# Patient Record
Sex: MALE
[De-identification: names, ages, dates, MRNs, and addresses within clinical notes are randomized per-mention and may not be internally consistent; named-entity substitution may affect disease eponyms.]

---

## 2018-08-20 ENCOUNTER — RESULT REVIEW (OUTPATIENT)
Age: 63
End: 2018-08-20

## 2018-11-26 ENCOUNTER — RESULT REVIEW (OUTPATIENT)
Age: 63
End: 2018-11-26

## 2019-06-07 PROBLEM — Z00.00 ENCOUNTER FOR PREVENTIVE HEALTH EXAMINATION: Status: ACTIVE | Noted: 2019-06-07

## 2019-09-27 ENCOUNTER — APPOINTMENT (OUTPATIENT)
Dept: GASTROENTEROLOGY | Facility: CLINIC | Age: 64
End: 2019-09-27
Payer: COMMERCIAL

## 2019-09-27 VITALS
DIASTOLIC BLOOD PRESSURE: 91 MMHG | BODY MASS INDEX: 30.01 KG/M2 | SYSTOLIC BLOOD PRESSURE: 150 MMHG | HEIGHT: 68 IN | HEART RATE: 63 BPM | WEIGHT: 198 LBS

## 2019-09-27 PROCEDURE — 99213 OFFICE O/P EST LOW 20 MIN: CPT

## 2019-09-27 NOTE — ASSESSMENT
[FreeTextEntry1] : 63 y/o male here today with elevated LFTs and here to schedule a follow up egd.  \par Pt with hx of gerd. \par Continue ppi and gerd diet. \par Increase PPI to BID pending above. \par Mildly elevated lfts likely 2/2 fatty liver, will repeat blood work in several weeks. \par Check sonogram, and follow up egd. \par Discussed with Dr. Cruz.

## 2019-09-27 NOTE — PHYSICAL EXAM
[General Appearance - Alert] : alert [General Appearance - In No Acute Distress] : in no acute distress [Sclera] : the sclera and conjunctiva were normal [PERRL With Normal Accommodation] : pupils were equal in size, round, and reactive to light [Extraocular Movements] : extraocular movements were intact [Heart Rate And Rhythm] : heart rate was normal and rhythm regular [Auscultation Breath Sounds / Voice Sounds] : lungs were clear to auscultation bilaterally [Heart Sounds Gallop] : no gallops [Heart Sounds] : normal S1 and S2 [Murmurs] : no murmurs [Heart Sounds Pericardial Friction Rub] : no pericardial rub [Abdomen Soft] : soft [Bowel Sounds] : normal bowel sounds [Abdomen Tenderness] : non-tender [] : no hepato-splenomegaly [Abdomen Mass (___ Cm)] : no abdominal mass palpated [Abnormal Walk] : normal gait [Nail Clubbing] : no clubbing  or cyanosis of the fingernails [Motor Tone] : muscle strength and tone were normal [Musculoskeletal - Swelling] : no joint swelling seen [Impaired Insight] : insight and judgment were intact [Oriented To Time, Place, And Person] : oriented to person, place, and time [Affect] : the affect was normal

## 2019-09-27 NOTE — HISTORY OF PRESENT ILLNESS
[de-identified] : 63 y/o male here today with elevated LFTs and here to schedule a follow up egd.  \par Pt with hx of gerd, currently taken ppi once daily with increased breakthrough symptoms.  He also reports feeling as though his stool are more gassy.  He was also noted to have mildly elevated lfts by PCP.  Denies hx of fatty liver.  Denies increased alcohol use.  Denies any fevers, n/v, rectal bleeding, or melena. \par

## 2019-10-02 ENCOUNTER — RESULT REVIEW (OUTPATIENT)
Age: 64
End: 2019-10-02

## 2019-10-02 ENCOUNTER — APPOINTMENT (OUTPATIENT)
Dept: GASTROENTEROLOGY | Facility: AMBULATORY MEDICAL SERVICES | Age: 64
End: 2019-10-02
Payer: COMMERCIAL

## 2019-10-02 PROCEDURE — 43239 EGD BIOPSY SINGLE/MULTIPLE: CPT

## 2019-10-07 ENCOUNTER — RX RENEWAL (OUTPATIENT)
Age: 64
End: 2019-10-07

## 2019-11-27 LAB
ALBUMIN SERPL ELPH-MCNC: 4.6 G/DL
ALP BLD-CCNC: 48 U/L
ALT SERPL-CCNC: 48 U/L
ANION GAP SERPL CALC-SCNC: 14 MMOL/L
AST SERPL-CCNC: 27 U/L
BASOPHILS # BLD AUTO: 0.08 K/UL
BASOPHILS NFR BLD AUTO: 1.6 %
BILIRUB SERPL-MCNC: 0.7 MG/DL
BUN SERPL-MCNC: 19 MG/DL
CALCIUM SERPL-MCNC: 9.6 MG/DL
CHLORIDE SERPL-SCNC: 106 MMOL/L
CO2 SERPL-SCNC: 26 MMOL/L
CREAT SERPL-MCNC: 1.08 MG/DL
EOSINOPHIL # BLD AUTO: 0.24 K/UL
EOSINOPHIL NFR BLD AUTO: 4.9 %
GLUCOSE SERPL-MCNC: 98 MG/DL
HCT VFR BLD CALC: 43 %
HGB BLD-MCNC: 14.4 G/DL
IMM GRANULOCYTES NFR BLD AUTO: 0.2 %
LYMPHOCYTES # BLD AUTO: 1.22 K/UL
LYMPHOCYTES NFR BLD AUTO: 25.1 %
MAN DIFF?: NORMAL
MCHC RBC-ENTMCNC: 30.3 PG
MCHC RBC-ENTMCNC: 33.5 GM/DL
MCV RBC AUTO: 90.5 FL
MONOCYTES # BLD AUTO: 0.43 K/UL
MONOCYTES NFR BLD AUTO: 8.8 %
NEUTROPHILS # BLD AUTO: 2.89 K/UL
NEUTROPHILS NFR BLD AUTO: 59.4 %
PLATELET # BLD AUTO: 236 K/UL
POTASSIUM SERPL-SCNC: 4.5 MMOL/L
PROT SERPL-MCNC: 6.7 G/DL
RBC # BLD: 4.75 M/UL
RBC # FLD: 13.2 %
SODIUM SERPL-SCNC: 146 MMOL/L
WBC # FLD AUTO: 4.87 K/UL

## 2019-12-27 ENCOUNTER — APPOINTMENT (OUTPATIENT)
Dept: GASTROENTEROLOGY | Facility: CLINIC | Age: 64
End: 2019-12-27
Payer: COMMERCIAL

## 2019-12-27 VITALS
DIASTOLIC BLOOD PRESSURE: 86 MMHG | BODY MASS INDEX: 29.1 KG/M2 | HEART RATE: 66 BPM | SYSTOLIC BLOOD PRESSURE: 126 MMHG | WEIGHT: 192 LBS | HEIGHT: 68 IN

## 2019-12-27 DIAGNOSIS — Z80.9 FAMILY HISTORY OF MALIGNANT NEOPLASM, UNSPECIFIED: ICD-10-CM

## 2019-12-27 DIAGNOSIS — Z78.9 OTHER SPECIFIED HEALTH STATUS: ICD-10-CM

## 2019-12-27 DIAGNOSIS — R10.84 GENERALIZED ABDOMINAL PAIN: ICD-10-CM

## 2019-12-27 PROCEDURE — 99213 OFFICE O/P EST LOW 20 MIN: CPT

## 2019-12-27 NOTE — HISTORY OF PRESENT ILLNESS
[de-identified] : 65 y/o male here today with reports of worsening epigastric pains.  Pt s/p EGD with Dr. Kruse and was otherwise unremarkable.  He states for the past several days having epigastric pain and nausea.  About a few days ago he reports this pain was associated with chills and was going to go to the ER but didn't.  He is under significant amount of stress as he has a new boss.  Currently on PPI with not much relief.  Pt with hx of afib in past and had an ablation done.  He hasn't followed up with cardiology but is going to make an appointment.  Pt with hx of gerd, currently taken ppi once daily with increased breakthrough symptoms.  He also reports feeling as though his stool are more gassy.  Denies increased alcohol use.  Denies any fevers, n/v, rectal bleeding, or melena. \par

## 2019-12-27 NOTE — ASSESSMENT
[FreeTextEntry1] : 63 y/o male here today with hx of gerd and now worsening epigastric pain, nausea that is intermittent associated with chills. Currently patient is asymptomatic.  EGD noted, on ppi.  Hx of afib s/p ablation several years ago.  Pt instructed to f/u with cardiology and if pain returns to go to the ER for evaluation.  Pt already has appointment with Dr. Kruse.  Discussed with Dr. Cruz.

## 2019-12-27 NOTE — REVIEW OF SYSTEMS
[As Noted in HPI] : as noted in HPI [Abdominal Pain] : abdominal pain [Heartburn] : heartburn [Negative] : Heme/Lymph [FreeTextEntry7] : +gassy stools.

## 2019-12-27 NOTE — PHYSICAL EXAM
[General Appearance - Alert] : alert [General Appearance - In No Acute Distress] : in no acute distress [Sclera] : the sclera and conjunctiva were normal [Extraocular Movements] : extraocular movements were intact [PERRL With Normal Accommodation] : pupils were equal in size, round, and reactive to light [Auscultation Breath Sounds / Voice Sounds] : lungs were clear to auscultation bilaterally [Heart Rate And Rhythm] : heart rate was normal and rhythm regular [Heart Sounds] : normal S1 and S2 [Heart Sounds Gallop] : no gallops [Murmurs] : no murmurs [Heart Sounds Pericardial Friction Rub] : no pericardial rub [Bowel Sounds] : normal bowel sounds [Abdomen Soft] : soft [] : no hepato-splenomegaly [Abdomen Tenderness] : non-tender [Abnormal Walk] : normal gait [Abdomen Mass (___ Cm)] : no abdominal mass palpated [Nail Clubbing] : no clubbing  or cyanosis of the fingernails [Musculoskeletal - Swelling] : no joint swelling seen [Motor Tone] : muscle strength and tone were normal [Oriented To Time, Place, And Person] : oriented to person, place, and time [Affect] : the affect was normal [Impaired Insight] : insight and judgment were intact

## 2020-01-09 ENCOUNTER — APPOINTMENT (OUTPATIENT)
Dept: GASTROENTEROLOGY | Facility: CLINIC | Age: 65
End: 2020-01-09
Payer: COMMERCIAL

## 2020-01-09 VITALS
DIASTOLIC BLOOD PRESSURE: 98 MMHG | HEIGHT: 68 IN | BODY MASS INDEX: 28.79 KG/M2 | SYSTOLIC BLOOD PRESSURE: 153 MMHG | HEART RATE: 67 BPM | WEIGHT: 190 LBS

## 2020-01-09 DIAGNOSIS — R10.13 EPIGASTRIC PAIN: ICD-10-CM

## 2020-01-09 PROCEDURE — 99214 OFFICE O/P EST MOD 30 MIN: CPT

## 2020-01-09 NOTE — HISTORY OF PRESENT ILLNESS
[FreeTextEntry1] : The patient continues to have episode of epigastric pain that can last days associated with nausea. He has been under a great deal of stress his bowels are somewhat stringy with a mucous discharge and a recent sonogram showed he had gallstones he was tried on PPI therapy and Carafate with minimal success

## 2020-01-09 NOTE — PHYSICAL EXAM
[General Appearance - Alert] : alert [General Appearance - In No Acute Distress] : in no acute distress [Sclera] : the sclera and conjunctiva were normal [PERRL With Normal Accommodation] : pupils were equal in size, round, and reactive to light [Extraocular Movements] : extraocular movements were intact [Outer Ear] : the ears and nose were normal in appearance [Oropharynx] : the oropharynx was normal [Neck Cervical Mass (___cm)] : no neck mass was observed [Neck Appearance] : the appearance of the neck was normal [Jugular Venous Distention Increased] : there was no jugular-venous distention [Thyroid Diffuse Enlargement] : the thyroid was not enlarged [Thyroid Nodule] : there were no palpable thyroid nodules [Auscultation Breath Sounds / Voice Sounds] : lungs were clear to auscultation bilaterally [Heart Rate And Rhythm] : heart rate was normal and rhythm regular [Heart Sounds] : normal S1 and S2 [Heart Sounds Gallop] : no gallops [Murmurs] : no murmurs [Heart Sounds Pericardial Friction Rub] : no pericardial rub [Full Pulse] : the pedal pulses are present [Edema] : there was no peripheral edema [Bowel Sounds] : normal bowel sounds [Abdomen Soft] : soft [Abdomen Tenderness] : non-tender [Abdomen Mass (___ Cm)] : no abdominal mass palpated [Cervical Lymph Nodes Enlarged Posterior Bilaterally] : posterior cervical [Cervical Lymph Nodes Enlarged Anterior Bilaterally] : anterior cervical [Supraclavicular Lymph Nodes Enlarged Bilaterally] : supraclavicular [Axillary Lymph Nodes Enlarged Bilaterally] : axillary [Femoral Lymph Nodes Enlarged Bilaterally] : femoral [Inguinal Lymph Nodes Enlarged Bilaterally] : inguinal [No CVA Tenderness] : no ~M costovertebral angle tenderness [No Spinal Tenderness] : no spinal tenderness [Nail Clubbing] : no clubbing  or cyanosis of the fingernails [Abnormal Walk] : normal gait [Motor Tone] : muscle strength and tone were normal [Musculoskeletal - Swelling] : no joint swelling seen [Skin Color & Pigmentation] : normal skin color and pigmentation [] : no rash [Skin Turgor] : normal skin turgor [Deep Tendon Reflexes (DTR)] : deep tendon reflexes were 2+ and symmetric [No Focal Deficits] : no focal deficits [Sensation] : the sensory exam was normal to light touch and pinprick [Oriented To Time, Place, And Person] : oriented to person, place, and time [Impaired Insight] : insight and judgment were intact [Affect] : the affect was normal

## 2020-06-19 ENCOUNTER — EMERGENCY (EMERGENCY)
Facility: HOSPITAL | Age: 65
LOS: 0 days | Discharge: HOME | End: 2020-06-20
Attending: EMERGENCY MEDICINE
Payer: COMMERCIAL

## 2020-06-19 VITALS
DIASTOLIC BLOOD PRESSURE: 99 MMHG | SYSTOLIC BLOOD PRESSURE: 187 MMHG | TEMPERATURE: 98 F | HEART RATE: 71 BPM | HEIGHT: 68 IN | WEIGHT: 197.09 LBS | RESPIRATION RATE: 18 BRPM | OXYGEN SATURATION: 97 %

## 2020-06-19 VITALS
OXYGEN SATURATION: 98 % | HEART RATE: 62 BPM | SYSTOLIC BLOOD PRESSURE: 134 MMHG | DIASTOLIC BLOOD PRESSURE: 77 MMHG | RESPIRATION RATE: 16 BRPM

## 2020-06-19 DIAGNOSIS — R47.9 UNSPECIFIED SPEECH DISTURBANCES: ICD-10-CM

## 2020-06-19 DIAGNOSIS — R47.01 APHASIA: ICD-10-CM

## 2020-06-19 DIAGNOSIS — I48.91 UNSPECIFIED ATRIAL FIBRILLATION: ICD-10-CM

## 2020-06-19 DIAGNOSIS — R00.2 PALPITATIONS: ICD-10-CM

## 2020-06-19 DIAGNOSIS — K21.9 GASTRO-ESOPHAGEAL REFLUX DISEASE WITHOUT ESOPHAGITIS: ICD-10-CM

## 2020-06-19 DIAGNOSIS — G45.9 TRANSIENT CEREBRAL ISCHEMIC ATTACK, UNSPECIFIED: ICD-10-CM

## 2020-06-19 DIAGNOSIS — Z79.82 LONG TERM (CURRENT) USE OF ASPIRIN: ICD-10-CM

## 2020-06-19 DIAGNOSIS — Z90.49 ACQUIRED ABSENCE OF OTHER SPECIFIED PARTS OF DIGESTIVE TRACT: ICD-10-CM

## 2020-06-19 DIAGNOSIS — Z79.899 OTHER LONG TERM (CURRENT) DRUG THERAPY: ICD-10-CM

## 2020-06-19 DIAGNOSIS — I10 ESSENTIAL (PRIMARY) HYPERTENSION: ICD-10-CM

## 2020-06-19 LAB
ALBUMIN SERPL ELPH-MCNC: 3.9 G/DL — SIGNIFICANT CHANGE UP (ref 3.3–5)
ALP SERPL-CCNC: 45 U/L — SIGNIFICANT CHANGE UP (ref 40–120)
ALT FLD-CCNC: 47 U/L — SIGNIFICANT CHANGE UP (ref 12–78)
ANION GAP SERPL CALC-SCNC: 2 MMOL/L — LOW (ref 5–17)
APTT BLD: 30.1 SEC — SIGNIFICANT CHANGE UP (ref 27.5–36.3)
AST SERPL-CCNC: 20 U/L — SIGNIFICANT CHANGE UP (ref 15–37)
BASOPHILS # BLD AUTO: 0.07 K/UL — SIGNIFICANT CHANGE UP (ref 0–0.2)
BASOPHILS NFR BLD AUTO: 1.5 % — SIGNIFICANT CHANGE UP (ref 0–2)
BILIRUB SERPL-MCNC: 0.5 MG/DL — SIGNIFICANT CHANGE UP (ref 0.2–1.2)
BUN SERPL-MCNC: 22 MG/DL — SIGNIFICANT CHANGE UP (ref 7–23)
CALCIUM SERPL-MCNC: 8.4 MG/DL — LOW (ref 8.5–10.1)
CHLORIDE SERPL-SCNC: 111 MMOL/L — HIGH (ref 96–108)
CO2 SERPL-SCNC: 32 MMOL/L — HIGH (ref 22–31)
CREAT SERPL-MCNC: 0.92 MG/DL — SIGNIFICANT CHANGE UP (ref 0.5–1.3)
EOSINOPHIL # BLD AUTO: 0.26 K/UL — SIGNIFICANT CHANGE UP (ref 0–0.5)
EOSINOPHIL NFR BLD AUTO: 5.7 % — SIGNIFICANT CHANGE UP (ref 0–6)
GLUCOSE SERPL-MCNC: 91 MG/DL — SIGNIFICANT CHANGE UP (ref 70–99)
HCT VFR BLD CALC: 38.1 % — LOW (ref 39–50)
HGB BLD-MCNC: 13.2 G/DL — SIGNIFICANT CHANGE UP (ref 13–17)
IMM GRANULOCYTES NFR BLD AUTO: 0.2 % — SIGNIFICANT CHANGE UP (ref 0–1.5)
INR BLD: 1 RATIO — SIGNIFICANT CHANGE UP (ref 0.88–1.16)
LYMPHOCYTES # BLD AUTO: 1.54 K/UL — SIGNIFICANT CHANGE UP (ref 1–3.3)
LYMPHOCYTES # BLD AUTO: 34 % — SIGNIFICANT CHANGE UP (ref 13–44)
MCHC RBC-ENTMCNC: 29.9 PG — SIGNIFICANT CHANGE UP (ref 27–34)
MCHC RBC-ENTMCNC: 34.6 GM/DL — SIGNIFICANT CHANGE UP (ref 32–36)
MCV RBC AUTO: 86.2 FL — SIGNIFICANT CHANGE UP (ref 80–100)
MONOCYTES # BLD AUTO: 0.44 K/UL — SIGNIFICANT CHANGE UP (ref 0–0.9)
MONOCYTES NFR BLD AUTO: 9.7 % — SIGNIFICANT CHANGE UP (ref 2–14)
NEUTROPHILS # BLD AUTO: 2.21 K/UL — SIGNIFICANT CHANGE UP (ref 1.8–7.4)
NEUTROPHILS NFR BLD AUTO: 48.9 % — SIGNIFICANT CHANGE UP (ref 43–77)
PLATELET # BLD AUTO: 204 K/UL — SIGNIFICANT CHANGE UP (ref 150–400)
POTASSIUM SERPL-MCNC: 3.8 MMOL/L — SIGNIFICANT CHANGE UP (ref 3.5–5.3)
POTASSIUM SERPL-SCNC: 3.8 MMOL/L — SIGNIFICANT CHANGE UP (ref 3.5–5.3)
PROT SERPL-MCNC: 7 GM/DL — SIGNIFICANT CHANGE UP (ref 6–8.3)
PROTHROM AB SERPL-ACNC: 11.1 SEC — SIGNIFICANT CHANGE UP (ref 10–12.9)
RBC # BLD: 4.42 M/UL — SIGNIFICANT CHANGE UP (ref 4.2–5.8)
RBC # FLD: 13.2 % — SIGNIFICANT CHANGE UP (ref 10.3–14.5)
SODIUM SERPL-SCNC: 145 MMOL/L — SIGNIFICANT CHANGE UP (ref 135–145)
TROPONIN I SERPL-MCNC: <0.015 NG/ML — SIGNIFICANT CHANGE UP (ref 0.01–0.04)
WBC # BLD: 4.53 K/UL — SIGNIFICANT CHANGE UP (ref 3.8–10.5)
WBC # FLD AUTO: 4.53 K/UL — SIGNIFICANT CHANGE UP (ref 3.8–10.5)

## 2020-06-19 PROCEDURE — 85610 PROTHROMBIN TIME: CPT

## 2020-06-19 PROCEDURE — 85730 THROMBOPLASTIN TIME PARTIAL: CPT

## 2020-06-19 PROCEDURE — 84484 ASSAY OF TROPONIN QUANT: CPT

## 2020-06-19 PROCEDURE — 99285 EMERGENCY DEPT VISIT HI MDM: CPT

## 2020-06-19 PROCEDURE — 99285 EMERGENCY DEPT VISIT HI MDM: CPT | Mod: 25

## 2020-06-19 PROCEDURE — 70450 CT HEAD/BRAIN W/O DYE: CPT | Mod: 26

## 2020-06-19 PROCEDURE — 85025 COMPLETE CBC W/AUTO DIFF WBC: CPT

## 2020-06-19 PROCEDURE — 80053 COMPREHEN METABOLIC PANEL: CPT

## 2020-06-19 PROCEDURE — 93010 ELECTROCARDIOGRAM REPORT: CPT

## 2020-06-19 PROCEDURE — 93005 ELECTROCARDIOGRAM TRACING: CPT

## 2020-06-19 PROCEDURE — 70450 CT HEAD/BRAIN W/O DYE: CPT

## 2020-06-19 PROCEDURE — 36415 COLL VENOUS BLD VENIPUNCTURE: CPT

## 2020-06-19 NOTE — ED ADULT NURSE NOTE - CHPI ED NUR SYMPTOMS NEG
no syncope/no diaphoresis/no back pain/no dizziness/no shortness of breath/no chest pain/no congestion/no nausea/no chills/no fever/no vomiting

## 2020-06-19 NOTE — ED PROVIDER NOTE - NS ED SCRIBE STATEMENT
Past Patient History





- Past Medical History & Family History


Past Medical History?: Yes





- Past Social History


Smoking Status: Never Smoked





- CARDIAC


Hx Cardiac Disorders: No





- PULMONARY


Hx Respiratory Disorders: No





- NEUROLOGICAL


Hx Neurological Disorder: No





- HEENT


Hx HEENT Problems: No





- RENAL


Hx Chronic Kidney Disease: No





- ENDOCRINE/METABOLIC


Hx Endocrine Disorders: Yes


Hx Hyperthyroidism: Yes





- HEMATOLOGICAL/ONCOLOGICAL


Hx Blood Disorders: No





- INTEGUMENTARY


Hx Dermatological Problems: No





- MUSCULOSKELETAL/RHEUMATOLOGICAL


Hx Musculoskeletal Disorders: Yes


Hx Falls: Yes


Hx Fractures: Yes (Left ankle)


Hx Herniated Disk: Yes (Lower back)


Hx Osteoarthritis: Yes (Right shoulder)





- GASTROINTESTINAL


Hx Gastrointestinal Disorders: Yes


Hx Bowel Surgery: Yes (BOWEL RESECTIO FOR DIVERTICULITIS)


Hx Diverticulitis: Yes





- GENITOURINARY/GYNECOLOGICAL


Hx Genitourinary Disorders: Yes


Hx Bladder Stone: Yes


Hx Prostate Problems: Yes


Other/Comment: HX: "BLADDER STONES" STENTS PLACED AND REMOVED.  HX: URINARY 

RETENTION-PT. HAS URINARY CATHETER-RETENTION STARTED AFTER SURGERY FOR 

DIVERTULITIS





- PSYCHIATRIC


Hx Psychophysiologic Disorder: No





- SURGICAL HISTORY


Hx Surgeries: Yes


Hx Herniorrhaphy: Yes (UMBILICAL)





- ANESTHESIA


Hx Anesthesia: Yes


Hx Anesthesia Reactions: No


Hx Malignant Hyperthermia: No





Meds


Allergies/Adverse Reactions: 


 Allergies











Allergy/AdvReac Type Severity Reaction Status Date / Time


 


No Known Allergies Allergy   Verified 02/28/17 11:00














Results





- Vital Signs


Recent Vital Signs: 





 Last Vital Signs











Temp  97 F L  07/20/18 13:50


 


Pulse  67   07/20/18 15:05


 


Resp  17   07/20/18 15:05


 


BP  125/75   07/20/18 15:05


 


Pulse Ox  100   07/20/18 15:05 Attending

## 2020-06-19 NOTE — ED PROVIDER NOTE - PROGRESS NOTE DETAILS
64 y/o M presents with difficulty speaking on 6/16/ Pt was at work at 10 AM and felt he had diffiuclty getting out his words lasting for approx 45 minutes, and had another episode approx 1 hr later lasting 20 minutes. Pt saw his dr today for routine visit, was started on asa and scheduled for carotid dopplar tomorrow as well as neuro and cardiology FU. Pt came to ED today because he felt palpitations and was concerned he was in afib. Denies fever/chills, n/v/d, CP, SOB, abd pain, weakness, numbness or tingling or other complaints at this time. -Lalo Hawk PA-C Dr. Norwood:  Signout to Dr. Rea:  [] CT head,   [] labs incl. troponin,   []cardiac monitor (EKG: + NSR).  Expect D/C home on ASA-325 if studies negative, has Cardio./Neuro referrals for next week. Results reviewed and discussed with pt. Pt to continue taking asa. He has appt for carotid doppler tomorrow, will FU with his PMD/neuro. Discussed importance of close FU with PMD. Pt asked to return to ED immediately for any new or concerning sx or worsening. Pt acknowledges and understands plan -Lalo Hawk PA-C

## 2020-06-19 NOTE — ED PROVIDER NOTE - ATTENDING CONTRIBUTION TO CARE
I, Jose Norwood MD, personally saw the patient with the PA, and completed the key components of the history and physical exam. I then discussed the management plan with the PA.

## 2020-06-19 NOTE — ED ADULT TRIAGE NOTE - CHIEF COMPLAINT QUOTE
Pt was sent here by Dr. Alonso to rule out TIA. On Tuesday while at work patient states he had difficulty word finding but no other symptom for about 45 minutes. Pt did not come to hospital or get assessed until today. Pt currently does not show any signs of a stroke/TIA. Hx Afib, does not take any medications. No Code stroke needed per Dr. Huerta.

## 2020-06-19 NOTE — ED PROVIDER NOTE - NEUROLOGICAL, MLM
Alert and oriented x 3, cn 2-12 intact, normal speech, no focal deficits, no motor or sensory deficits.

## 2020-06-19 NOTE — ED PROVIDER NOTE - NSFOLLOWUPINSTRUCTIONS_ED_ALL_ED_FT
Continue your regul;ar medications as per routine.  Aspirin-325 mg 1 tab daily.  Follow up with Neurology & cardiology offices as provided by your PCP.      ED evaluation and management discussed with the patient and family (if available) in detail.  Close PMD follow up encouraged.  Strict ED return instructions discussed in detail and patient given the opportunity to ask any questions about their discharge diagnosis and instructions. Patient verbalized understanding. Continue your regul;ar medications as per routine.  Aspirin-325 mg 1 tab daily.  Follow up with Neurology & cardiology offices as provided by your PCP.      ED evaluation and management discussed with the patient and family (if available) in detail.  Close PMD follow up encouraged.  Strict ED return instructions discussed in detail and patient given the opportunity to ask any questions about their discharge diagnosis and instructions. Patient verbalized understanding.      Transient Ischemic Attack  A transient ischemic attack (TIA) is a "warning stroke" that causes stroke-like symptoms that then go away quickly. The symptoms of a TIA come on suddenly, and they last less than 24 hours. Unlike a stroke, a TIA does not cause permanent damage to the brain. It is important to know the symptoms of a TIA and what to do. Seek medical care right away, even if your symptoms go away.  Having a TIA is a sign that you are at higher risk for a permanent stroke. Lifestyle changes and medical treatments can help prevent a stroke.  What are the causes?  This condition is caused by a temporary blockage in an artery in the head or neck. The blockage does not allow the brain to get the blood supply it needs and can cause various symptoms. The blockage can be caused by:  Fatty buildup in an artery in the head or neck (atherosclerosis).A blood clot.Tearing of an artery (dissection).Inflammation of an artery (vasculitis).Sometimes the cause is not known.  What increases the risk?  Certain factors may make you more likely to develop this condition. Some of these factors are things that you can change, such as:  Obesity.Using products that contain nicotine or tobacco, such as cigarettes and e-cigarettes.Taking oral birth control, especially if you also use tobacco.Lack of physical inactivity.Excessive use of alcohol.Use of drugs, especially cocaine and methamphetamine.Other risk factors include:  High blood pressure (hypertension).High cholesterol.Diabetes mellitus.Heart disease (coronary artery disease).Atrial fibrillation.Being  or .Being over the age of 60.Being male.Family history of stroke.Previous history of blood clots, stroke, TIA, or heart attack.Sickle cell disease.Being a woman with a history of preeclampsia.Migraine headache.Sleep apnea.Chronic inflammatory diseases, such as rheumatoid arthritis or lupus.Blood clotting disorders (hypercoagulable state).What are the signs or symptoms?  Symptoms of this condition are the same as those of a stroke, but they are temporary. The symptoms develop suddenly, and they go away quickly, usually within minutes to hours. Symptoms may include sudden:  Weakness or numbness in your face, arm, or leg, especially on one side of your body.Trouble walking or difficulty moving your arms or legs.Trouble speaking, understanding speech, or both (aphasia).Vision changes in one or both eyes. These include double vision, blurred vision, or loss of vision.Dizziness.Confusion.Loss of balance or coordination.Nausea and vomiting.Severe headache with no known cause.If possible, make note of the exact time that you last felt like your normal self and what time your symptoms started. Tell your health care provider.  How is this diagnosed?  This condition may be diagnosed based on:  Your symptoms and medical history.A physical exam.Imaging tests, usually a CT or MRI scan of the brain.Blood tests.You may also have other tests, including:  Electrocardiogram (ECG).Echocardiogram.Carotid ultrasound.A scan of the brain circulation (CT angiogram or MRI angiogram).Continuous heart monitoring.How is this treated?  The goal of treatment is to reduce the risk for a subsequent stroke. Treatment may include stroke prevention therapies such as:  Changes to diet or lifestyle to decrease your risk. Lifestyle changes may include exercising and stopping smoking.Medicines to thin the blood (antiplatelets or anticoagulants).Blood pressure medicines.Medicines to reduce cholesterol.Treating other health conditions, such as diabetes or atrial fibrillation.If testing shows that you have narrowing in the arteries to your brain, your health care provider may recommend a procedure, such as:  Carotid endarterectomy. This is a surgery to remove the blockage from your artery.Carotid angioplasty and stenting. This is a procedure to open or widen an artery in the neck using a metal mesh tube (stent). The stent helps keep the artery open by supporting the artery walls.Follow these instructions at home:  Medicines        Take over-the-counter and prescription medicines only as told by your health care provider.If you were told to take a medicine to thin your blood, such as aspirin or an anticoagulant, take it exactly as told by your health care provider.  Taking too much blood-thinning medicine can cause bleeding.If you do not take enough blood-thinning medicine, you will not have the protection that you need against a stroke and other problems.Eating and drinking        Eat 5 or more servings of fruits and vegetables each day.Follow instructions from your health care provider about diet. You may need to follow a certain nutrition plan to help manage risk factors for stroke, such as high blood pressure, high cholesterol, diabetes, or obesity. This may include:  Eating a low-fat, low-salt diet.Including a lot of fiber in your diet.Limiting the amount of carbohydrates and sugar in your diet.Limit alcohol intake to no more than 1 drink a day for nonpregnant women and 2 drinks a day for men. One drink equals 12 oz of beer, 5 oz of wine, or 1½ oz of hard liquor.General instructions     Maintain a healthy weight.Stay physically active. Try to get at least 30 minutes of exercise on most or all days.Find out if you have sleep apnea, and seek treatment if needed.Do not use any products that contain nicotine or tobacco, such as cigarettes and e-cigarettes. If you need help quitting, ask your health care provider.Do not abuse drugs.Keep all follow-up visits as told by your health care provider. This is important.Where to find more information  American Stroke Association: www.strokeassociation.orgNational Stroke Association: www.stroke.orgGet help right away if:  You have chest pain or an irregular heartbeat.You have any symptoms of stroke. The acronym BEFAST is an easy way to remember the main warning signs of stroke.  B - Balance problems. Signs include dizziness, sudden trouble walking, or loss of balance.E - Eye problems. This includes trouble seeing or a sudden change in vision.F - Face changes. This includes sudden weakness or numbness of the face, or the face or eyelid drooping to one side.A - Arm weakness or numbness. This happens suddenly and usually on one side of the body.S - Speech problems. This includes trouble speaking or trouble understanding speech.T - Time. Time to call 911 or seek emergency care. Do not wait to see if symptoms will go away. Make note of the time your symptoms started.Other signs of stroke may include:  A sudden, severe headache with no known cause.Nausea or vomiting.Seizure.These symptoms may represent a serious problem that is an emergency. Do not wait to see if the symptoms will go away. Get medical help right away. Call your local emergency services (911 in the [place] Do not drive yourself to the hospital.   Summary  A TIA happens when an artery in the head or neck is blocked, leading to stroke-like symptoms that then go away quickly. The blockage clears before there is any permanent brain damage. A TIA is a medical emergency and requires immediate medical attention.Symptoms of this condition are the same as those of a stroke, but they are temporary. The symptoms usually develop suddenly, and they go away quickly, usually within minutes to hours.Having a TIA means that you are at high risk of a stroke in the near future.Treatment may include medicines to thin the blood as well as medicines, diet changes, and lifestyle changes to manage conditions that increase the risk of another TIA or a stroke.This information is not intended to replace advice given to you by your health care provider. Make sure you discuss any questions you have with your health care provider. Continue your regul;ar medications as per routine.  Aspirin-325 mg 1 tab daily.  Follow up with Neurology & cardiology offices as provided by your PCP.      ED evaluation and management discussed with the patient and family (if available) in detail.  Close PMD follow up encouraged.  Strict ED return instructions discussed in detail and patient given the opportunity to ask any questions about their discharge diagnosis and instructions. Patient verbalized understanding.      Transient Ischemic Attack  A transient ischemic attack (TIA) is a "warning stroke" that causes stroke-like symptoms that then go away quickly. The symptoms of a TIA come on suddenly, and they last less than 24 hours. Unlike a stroke, a TIA does not cause permanent damage to the brain. It is important to know the symptoms of a TIA and what to do. Seek medical care right away, even if your symptoms go away.  Having a TIA is a sign that you are at higher risk for a permanent stroke. Lifestyle changes and medical treatments can help prevent a stroke.  What are the causes?  This condition is caused by a temporary blockage in an artery in the head or neck. The blockage does not allow the brain to get the blood supply it needs and can cause various symptoms. The blockage can be caused by:  Fatty buildup in an artery in the head or neck (atherosclerosis).A blood clot.Tearing of an artery (dissection).Inflammation of an artery (vasculitis).Sometimes the cause is not known.  What increases the risk?  Certain factors may make you more likely to develop this condition. Some of these factors are things that you can change, such as:  Obesity.Using products that contain nicotine or tobacco, such as cigarettes and e-cigarettes.Taking oral birth control, especially if you also use tobacco.Lack of physical inactivity.Excessive use of alcohol.Use of drugs, especially cocaine and methamphetamine.Other risk factors include:  High blood pressure (hypertension).High cholesterol.Diabetes mellitus.Heart disease (coronary artery disease).Atrial fibrillation.Being  or .Being over the age of 60.Being male.Family history of stroke.Previous history of blood clots, stroke, TIA, or heart attack.Sickle cell disease.Being a woman with a history of preeclampsia.Migraine headache.Sleep apnea.Chronic inflammatory diseases, such as rheumatoid arthritis or lupus.Blood clotting disorders (hypercoagulable state).What are the signs or symptoms?  Symptoms of this condition are the same as those of a stroke, but they are temporary. The symptoms develop suddenly, and they go away quickly, usually within minutes to hours. Symptoms may include sudden:  Weakness or numbness in your face, arm, or leg, especially on one side of your body.Trouble walking or difficulty moving your arms or legs.Trouble speaking, understanding speech, or both (aphasia).Vision changes in one or both eyes. These include double vision, blurred vision, or loss of vision.Dizziness.Confusion.Loss of balance or coordination.Nausea and vomiting.Severe headache with no known cause.If possible, make note of the exact time that you last felt like your normal self and what time your symptoms started. Tell your health care provider.  How is this diagnosed?  This condition may be diagnosed based on:  Your symptoms and medical history.A physical exam.Imaging tests, usually a CT or MRI scan of the brain.Blood tests.You may also have other tests, including:  Electrocardiogram (ECG).Echocardiogram.Carotid ultrasound.A scan of the brain circulation (CT angiogram or MRI angiogram).Continuous heart monitoring.How is this treated?  The goal of treatment is to reduce the risk for a subsequent stroke. Treatment may include stroke prevention therapies such as:  Changes to diet or lifestyle to decrease your risk. Lifestyle changes may include exercising and stopping smoking.Medicines to thin the blood (antiplatelets or anticoagulants).Blood pressure medicines.Medicines to reduce cholesterol.Treating other health conditions, such as diabetes or atrial fibrillation.If testing shows that you have narrowing in the arteries to your brain, your health care provider may recommend a procedure, such as:  Carotid endarterectomy. This is a surgery to remove the blockage from your artery.Carotid angioplasty and stenting. This is a procedure to open or widen an artery in the neck using a metal mesh tube (stent). The stent helps keep the artery open by supporting the artery walls.Follow these instructions at home:  Medicines        Take over-the-counter and prescription medicines only as told by your health care provider.If you were told to take a medicine to thin your blood, such as aspirin or an anticoagulant, take it exactly as told by your health care provider.  Taking too much blood-thinning medicine can cause bleeding.If you do not take enough blood-thinning medicine, you will not have the protection that you need against a stroke and other problems.Eating and drinking        Eat 5 or more servings of fruits and vegetables each day.Follow instructions from your health care provider about diet. You may need to follow a certain nutrition plan to help manage risk factors for stroke, such as high blood pressure, high cholesterol, diabetes, or obesity. This may include:  Eating a low-fat, low-salt diet.Including a lot of fiber in your diet.Limiting the amount of carbohydrates and sugar in your diet.Limit alcohol intake to no more than 1 drink a day for nonpregnant women and 2 drinks a day for men. One drink equals 12 oz of beer, 5 oz of wine, or 1½ oz of hard liquor.General instructions     Maintain a healthy weight.Stay physically active. Try to get at least 30 minutes of exercise on most or all days.Find out if you have sleep apnea, and seek treatment if needed.Do not use any products that contain nicotine or tobacco, such as cigarettes and e-cigarettes. If you need help quitting, ask your health care provider.Do not abuse drugs.Keep all follow-up visits as told by your health care provider. This is important.Where to find more information  American Stroke Association: www.strokeassociation.orgNational Stroke Association: www.stroke.orgGet help right away if:  You have chest pain or an irregular heartbeat.You have any symptoms of stroke. The acronym BEFAST is an easy way to remember the main warning signs of stroke.  B - Balance problems. Signs include dizziness, sudden trouble walking, or loss of balance.E - Eye problems. This includes trouble seeing or a sudden change in vision.F - Face changes. This includes sudden weakness or numbness of the face, or the face or eyelid drooping to one side.A - Arm weakness or numbness. This happens suddenly and usually on one side of the body.S - Speech problems. This includes trouble speaking or trouble understanding speech.T - Time. Time to call 911 or seek emergency care. Do not wait to see if symptoms will go away. Make note of the time your symptoms started.Other signs of stroke may include:  A sudden, severe headache with no known cause.Nausea or vomiting.Seizure.These symptoms may represent a serious problem that is an emergency. Do not wait to see if the symptoms will go away. Get medical help right away. Call your local emergency services (911 in the [place] Do not drive yourself to the hospital.   Summary  A TIA happens when an artery in the head or neck is blocked, leading to stroke-like symptoms that then go away quickly. The blockage clears before there is any permanent brain damage. A TIA is a medical emergency and requires immediate medical attention.Symptoms of this condition are the same as those of a stroke, but they are temporary. The symptoms usually develop suddenly, and they go away quickly, usually within minutes to hours.Having a TIA means that you are at high risk of a stroke in the near future.Treatment may include medicines to thin the blood as well as medicines, diet changes, and lifestyle changes to manage conditions that increase the risk of another TIA or a stroke.This information is not intended to replace advice given to you by your health care provider. Make sure you discuss any questions you have with your health care provider.        DASH Eating Plan    WHAT YOU NEED TO KNOW:    The DASH (Dietary Approaches to Stop Hypertension) Eating Plan is designed to help prevent or lower high blood pressure. It can also help to lower LDL (bad) cholesterol and decrease your risk of heart disease. The plan is low in sodium, sugar, unhealthy fats, and total fat. It is high in potassium, calcium, magnesium, and fiber. These nutrients are added when you eat more fruits, vegetables, and whole grains.          DISCHARGE INSTRUCTIONS:    Your sodium limit each day: Your dietitian will tell you how much sodium is safe for you to have each day. People with high blood pressure should have no more than 1,500 to 2,300 mg of sodium in a day. A teaspoon (tsp) of salt has 2,300 mg of sodium. This may seem like a difficult goal, but small changes to the foods you eat can make a big difference. Your healthcare provider or dietitian can help you create a meal plan that follows your sodium limit.    How to limit sodium:     Read food labels. Food labels can help you choose foods that are low in sodium. The amount of sodium is listed in milligrams (mg). The % Daily Value (DV) column tells you how much of your daily needs are met by 1 serving of the food for each nutrient listed. Choose foods that have less than 5% of the DV of sodium. These foods are considered low in sodium. Foods that have 20% or more of the DV of sodium are considered high in sodium. Avoid foods that have more than 300 mg of sodium in each serving. Choose foods that say low-sodium, reduced-sodium, or no salt added on the food label.           Avoid salt. Do not salt food at the table, and add very little salt to foods during cooking. Use herbs and spices, such as onions, garlic, and salt-free seasonings to add flavor to foods. Try lemon or lime juice or vinegar to give foods a tart flavor. Use hot peppers or a small amount of hot pepper sauce to add a spicy flavor to foods.       Ask about salt substitutes. Ask your healthcare provider if you may use salt substitutes. Some salt substitutes have ingredients that can be harmful if you have certain health conditions.      Choose foods carefully at restaurants. Meals from restaurants, especially fast food restaurants, are often high in sodium. Some restaurants have nutrition information that tells you the amount of sodium in their foods. Ask to have your food prepared with less, or no salt.     What you need to know about fats:     Include healthy fats. Examples are unsaturated fats and omega-3 fatty acids. Unsaturated fats are found in soybean, canola, olive, or sunflower oil, and liquid and soft tub margarines. Omega-3 fatty acids are found in fatty fish, such as salmon, tuna, mackerel, and sardines. It is also found in flaxseed oil and ground flaxseed. Sources of Omega 3           Avoid unhealthy fats. Do not eat unhealthy fats, such as saturated fats and trans fats. Saturated fats are found in foods that contain fat from animals. Examples are fatty meats, whole milk, butter, cream, and other dairy foods. It is also found in shortening, stick margarine, palm oil, and coconut oil. Trans fats are found in fried foods, crackers, chips, and baked goods made with margarine or shortening.     Foods to include: With the DASH eating plan, you need to eat a certain number of servings from each food group. This will help you get enough of certain nutrients and limit others. The amount of servings you should eat depends on how many calories you need. Your dietitian can tell you how many calories you need. The number of servings listed next to the food groups below are for people who need about 2,000 calories each day.     Grains: 6 to 8 servings (3 of these servings should be whole-grain foods)  1 slice of whole-grain bread       1 ounce of dry cereal      ½ cup of cooked cereal, pasta, or brown rice      Vegetables and fruits: 4 to 5 servings of fruits and 4 to 5 servings of vegetables  1 medium fruit      ½ cup of frozen, canned (no added salt), or chopped fresh vegetables       ½ cup of fresh, frozen, dried, or canned fruit (canned in light syrup or fruit juice)      ½ cup of vegetable or fruit juice      Dairy: 2 to 3 servings  1 cup of nonfat (skim) or 1% milk      1½ ounces of fat-free or low-fat cheese      6 ounces of nonfat or low-fat yogurt      Lean meat, poultry, and fish: 6 ounces or less  Poultry (chicken, turkey) with no skin      Fish (especially fatty fish, such as salmon, fresh tuna, or mackerel)      Lean beef and pork (loin, round, extra lean hamburger)      Egg whites and egg substitutes      Nuts, seeds, and legumes: 4 to 5 servings each week  ½ cup of cooked beans and peas      1½ ounces of unsalted nuts      2 tablespoons of peanut butter or seeds      Sweets and added sugars: 5 or less each week  1 tablespoon of sugar, jelly, or jam      ½ cup of sorbet or gelatin      1 cup of lemonade      Fats: 2 to 3 servings each week  1 teaspoon of soft margarine or vegetable oil      1 tablespoon of mayonnaise      2 tablespoons of salad dressing    Foods to avoid:   Grains:   Baked goods, such as doughnuts, pastries, cookies, and biscuits (high in fat and sugar)    Mixes for cornbread and biscuits, packaged foods, such as bread stuffing, rice and pasta mixes, macaroni and cheese, and instant cereals (high in sodium)    Fruits and vegetables:   Regular, canned vegetables (high in sodium)    Sauerkraut, pickled vegetables, and other foods prepared in brine (high in sodium)    Fried vegetables or vegetables in butter or high-fat sauces    Fruit in cream or butter sauce (high in fat)    Dairy:   Whole milk, 2% milk, and cream (high in fat)    Regular cheese and processed cheese (high in fat and sodium)    Meats and protein foods:   Smoked or cured meat, such as corned beef, hunt, ham, hot dogs, and sausage (high in fat and sodium)    Canned beans and canned meats or spreads, such as potted meats, sardines, anchovies, and imitation seafood (high in sodium)    Deli or lunch meats, such as bologna, ham, turkey, and roast beef (high in sodium)    High-fat meat (T-bone steak, regular hamburger, and ribs)    Whole eggs and egg yolks (high in fat)    Other:   Seasonings made with salt, such as garlic salt, celery salt, onion salt, seasoned salt, meat tenderizers, and monosodium glutamate (MSG)    Miso soup and canned or dried soup mixes (high in sodium)    Regular soy sauce, barbecue sauce, teriyaki sauce, steak sauce, Worcestershire sauce, and most flavored vinegars (high in sodium)    Regular condiments, such as mustard, ketchup, and salad dressings (high in sodium)    Gravy and sauces, such as Will or cheese sauces (high in sodium and fat)    Drinks high in sugar, such as soda or fruit drinks    Snack foods, such as salted chips, popcorn, pretzels, pork rinds, salted crackers, and salted nuts    Frozen foods, such as dinners, entrees, vegetables with sauces, and breaded meats (high in sodium)    Other guidelines to follow:     Maintain a healthy weight. Your risk for heart disease is higher if you are overweight. Your healthcare provider may suggest that you lose weight if you are overweight. You can lose weight by eating fewer calories and foods that have added sugars and fat. The DASH meal plan can help you do this. Decrease calories by eating smaller portions at each meal and fewer snacks. Ask your healthcare provider for more information about how to lose weight.     Exercise regularly. Regular exercise can help you reach or maintain a healthy weight. Regular exercise can also help decrease your blood pressure and improve your cholesterol levels. Get 30 minutes or more of moderate exercise each day of the week. To lose weight, get at least 60 minutes of exercise. Talk to your healthcare provider about the best exercise program for you.Walking for Exercise     Limit alcohol. Women should limit alcohol to 1 drink a day. Men should limit alcohol to 2 drinks a day. A drink of alcohol is 12 ounces of beer, 5 ounces of wine, or 1½ ounces of liquor.

## 2020-06-19 NOTE — ED ADULT NURSE NOTE - OBJECTIVE STATEMENT
Pt A&Ox3 c/o difficulty speaking, palpitations.  Patient reports episode of difficulty speaking while at work on 6/16 lasting about 30-45 minutes.  Patient denies headache, visual changes, weakness, gait abnormality.  Patient seen by PCP today, directed to take aspirin and set up f/u head CT.  Patient came to ED due to onset of palpitations today.  PMH a fib, GERD, HTN.

## 2020-06-19 NOTE — ED PROVIDER NOTE - CHPI ED SYMPTOMS NEG
no weakness/no headache, no gait abnormality, no facial droop, no shortness of breath/no loss of consciousness

## 2020-06-19 NOTE — ED PROVIDER NOTE - PATIENT PORTAL LINK FT
You can access the FollowMyHealth Patient Portal offered by [facility] by registering at the following website: [email or web address]. By joining [facility]’s FollowMyHealth portal, you will also be able to view your health information using other applications (apps) compatible with our system.

## 2020-06-19 NOTE — ED PROVIDER NOTE - OBJECTIVE STATEMENT
64 y/o male with a PMHx of A-fib treated with ablation 10 years ago (currently not on any blood thinners), GERD, HTN, s/p cholecystectomy in January 2020, presents to the ED c/o difficulty speaking. On 06/16/20, while pt was at work, he had difficulty finding his words which lasted approx 30-45 minutes and sx self resolved. Pt had word finding disability/ expressive aphasia, coworker witnessed episode. No facial droop or focal extremity weakness or gait abnormality at that time. Pt went back to his office about 15-20 minutes later, and had another episode again which lasted for a couple of minutes and self resolved. No more reoccurrences since then. No MD evaluation at that time. Pt evaluated today by PCP for a routine check up, pt mentioned sx, and PMD wanted ultrasounds of carotids, pt was told to take a full aspirin, also setting up outpatient CT head non con and neuro f/u with Dr. Song and Dr. Noonan. Pt decided to go to ED due to feeling palpitations today. Denies chest pain, lightheadedness, diaphoresis, headache, or LOC. NKDA. No current cardiologist. 66 y/o male with a PMHx of A-fib treated with ablation 10 years ago (currently not on any blood thinners), GERD, HTN, s/p cholecystectomy in January 2020, ambulatory to ED c/o difficulty speaking. On 06/16/20, while pt was at work, he had difficulty finding his words which lasted approx 30-45 minutes and sx self resolved. Pt had word finding disability/ expressive aphasia, coworker witnessed episode. No facial droop or focal extremity weakness or gait abnormality at that time. Pt went back to his office about 15-20 minutes later, and had another episode again which lasted for a couple of minutes and self resolved. No more reoccurrences since then. No MD evaluation at that time. Pt evaluated today by PCP for a routine check up, pt mentioned sx, and PMD wanted ultrasounds of carotids, pt was told to take a full aspirin, also setting up outpatient CT head non con and neuro f/u with Dr. Song and Dr. Noonan. Pt decided to go to ED due to feeling palpitations today. Denies chest pain, lightheadedness, diaphoresis, headache, or LOC. NKDA. No current cardiologist. 66 y/o male with a PMHx of A-fib treated with ablation 10 years ago (currently not on any blood thinners), GERD, HTN, s/p cholecystectomy in January 2020, ambulatory to ED c/o difficulty speaking. On 06/16/20, while pt was at work, he had difficulty finding his words which lasted approx 30-45 minutes and sx self resolved. Pt had word finding disability/ expressive aphasia, coworker witnessed episode. No facial droop or focal extremity weakness or gait abnormality at that time. Pt went back to his office about 15-20 minutes later, and had another episode again which lasted for a couple of minutes and self resolved. No more reoccurrences since then. No MD evaluation at that time. Pt evaluated today by PCP for a routine check up, pt mentioned sx, and PMD wanted ultrasounds of carotids, pt was told to take a full aspirin, also setting up outpatient CT head non con and neuro f/u with Dr. Song and Dr. Noonan. Pt decided to go to ED due to feeling palpitations today, + resolved PTA. Denies chest pain, lightheadedness, diaphoresis, headache, or LOC. NKDA. No current cardiologist.

## 2020-06-19 NOTE — ED PROVIDER NOTE - CLINICAL SUMMARY MEDICAL DECISION MAKING FREE TEXT BOX
66 y/o male with pmhx of A-fib treated by ablation not on AC meds, HTN on PO meds, ambulatory to ED 3 days s/p 2 transient episodes of expressive aphagia. Had PCP evaluation earlier today initiating outpatient w/u. This evening +palpitations so pt came to ED for evaluation. Upon arrival, is asymptomatic. Physical exam. sx over 24 hours, pt not a candidate for code stroke for CTA nor tpa. Plan; CT head, EKG, labs including troponin, dysphasia screen, monitor observe and reassess, if results negative DC on AA 325mg daily, outpatient cardio and neuro f/u, pt already has referrals.

## 2020-08-10 PROBLEM — I10 ESSENTIAL (PRIMARY) HYPERTENSION: Chronic | Status: ACTIVE | Noted: 2020-06-25

## 2020-08-10 PROBLEM — I48.91 UNSPECIFIED ATRIAL FIBRILLATION: Chronic | Status: ACTIVE | Noted: 2020-06-25

## 2020-08-10 PROBLEM — K21.9 GASTRO-ESOPHAGEAL REFLUX DISEASE WITHOUT ESOPHAGITIS: Chronic | Status: ACTIVE | Noted: 2020-06-25

## 2020-08-27 ENCOUNTER — APPOINTMENT (OUTPATIENT)
Dept: GASTROENTEROLOGY | Facility: CLINIC | Age: 65
End: 2020-08-27
Payer: COMMERCIAL

## 2020-08-27 VITALS
SYSTOLIC BLOOD PRESSURE: 151 MMHG | HEART RATE: 62 BPM | WEIGHT: 197 LBS | DIASTOLIC BLOOD PRESSURE: 87 MMHG | BODY MASS INDEX: 29.95 KG/M2

## 2020-08-27 PROCEDURE — 82274 ASSAY TEST FOR BLOOD FECAL: CPT | Mod: QW

## 2020-08-27 PROCEDURE — 99214 OFFICE O/P EST MOD 30 MIN: CPT

## 2020-08-27 NOTE — HISTORY OF PRESENT ILLNESS
[FreeTextEntry1] : Patient's been having some rectal discharge, pruritus, foul-smelling mucous discharge, he continues to drink heavily, one to 2 times per week. No rectal bleeding and a colonoscopy in 2018 showed hyperplastic rectal polyp, but no other lesion

## 2020-08-27 NOTE — ASSESSMENT
[FreeTextEntry1] : #1 pruritus in eye. Will use Lotrisone cream b.i.d. #2 anal rectal pain, likely secondary to trauma of scratching was used, anal cream cream

## 2020-08-27 NOTE — PHYSICAL EXAM
[General Appearance - In No Acute Distress] : in no acute distress [General Appearance - Alert] : alert [Sclera] : the sclera and conjunctiva were normal [Extraocular Movements] : extraocular movements were intact [PERRL With Normal Accommodation] : pupils were equal in size, round, and reactive to light [Outer Ear] : the ears and nose were normal in appearance [Oropharynx] : the oropharynx was normal [Neck Appearance] : the appearance of the neck was normal [Neck Cervical Mass (___cm)] : no neck mass was observed [Thyroid Diffuse Enlargement] : the thyroid was not enlarged [Jugular Venous Distention Increased] : there was no jugular-venous distention [Thyroid Nodule] : there were no palpable thyroid nodules [Auscultation Breath Sounds / Voice Sounds] : lungs were clear to auscultation bilaterally [Heart Sounds] : normal S1 and S2 [Heart Rate And Rhythm] : heart rate was normal and rhythm regular [Murmurs] : no murmurs [Heart Sounds Gallop] : no gallops [Heart Sounds Pericardial Friction Rub] : no pericardial rub [Full Pulse] : the pedal pulses are present [Abdomen Soft] : soft [Bowel Sounds] : normal bowel sounds [Edema] : there was no peripheral edema [Abdomen Tenderness] : non-tender [Abdomen Mass (___ Cm)] : no abdominal mass palpated [Normal Sphincter Tone] : normal sphincter tone [No Rectal Mass] : no rectal mass [Occult Blood Positive] : stool was negative for occult blood [FreeTextEntry1] : pruritis ani [Cervical Lymph Nodes Enlarged Posterior Bilaterally] : posterior cervical [Cervical Lymph Nodes Enlarged Anterior Bilaterally] : anterior cervical [Axillary Lymph Nodes Enlarged Bilaterally] : axillary [Femoral Lymph Nodes Enlarged Bilaterally] : femoral [Supraclavicular Lymph Nodes Enlarged Bilaterally] : supraclavicular [Inguinal Lymph Nodes Enlarged Bilaterally] : inguinal [No Spinal Tenderness] : no spinal tenderness [No CVA Tenderness] : no ~M costovertebral angle tenderness [Musculoskeletal - Swelling] : no joint swelling seen [Abnormal Walk] : normal gait [Nail Clubbing] : no clubbing  or cyanosis of the fingernails [Motor Tone] : muscle strength and tone were normal [Skin Color & Pigmentation] : normal skin color and pigmentation [] : no rash [Skin Turgor] : normal skin turgor [Deep Tendon Reflexes (DTR)] : deep tendon reflexes were 2+ and symmetric [Sensation] : the sensory exam was normal to light touch and pinprick [No Focal Deficits] : no focal deficits [Impaired Insight] : insight and judgment were intact [Affect] : the affect was normal [Oriented To Time, Place, And Person] : oriented to person, place, and time

## 2020-10-27 ENCOUNTER — TRANSCRIPTION ENCOUNTER (OUTPATIENT)
Age: 65
End: 2020-10-27

## 2020-10-27 ENCOUNTER — APPOINTMENT (OUTPATIENT)
Dept: GASTROENTEROLOGY | Facility: CLINIC | Age: 65
End: 2020-10-27
Payer: COMMERCIAL

## 2020-10-27 VITALS
TEMPERATURE: 98.6 F | HEART RATE: 63 BPM | HEIGHT: 69 IN | WEIGHT: 197 LBS | DIASTOLIC BLOOD PRESSURE: 105 MMHG | SYSTOLIC BLOOD PRESSURE: 154 MMHG | BODY MASS INDEX: 29.18 KG/M2

## 2020-10-27 DIAGNOSIS — L29.0 PRURITUS ANI: ICD-10-CM

## 2020-10-27 PROCEDURE — 99213 OFFICE O/P EST LOW 20 MIN: CPT | Mod: 25

## 2020-10-27 PROCEDURE — 99072 ADDL SUPL MATRL&STAF TM PHE: CPT

## 2020-10-27 NOTE — PHYSICAL EXAM
[General Appearance - Alert] : alert [General Appearance - In No Acute Distress] : in no acute distress [Sclera] : the sclera and conjunctiva were normal [PERRL With Normal Accommodation] : pupils were equal in size, round, and reactive to light [Extraocular Movements] : extraocular movements were intact [Outer Ear] : the ears and nose were normal in appearance [Oropharynx] : the oropharynx was normal [Neck Appearance] : the appearance of the neck was normal [Neck Cervical Mass (___cm)] : no neck mass was observed [Jugular Venous Distention Increased] : there was no jugular-venous distention [Thyroid Diffuse Enlargement] : the thyroid was not enlarged [Thyroid Nodule] : there were no palpable thyroid nodules [Auscultation Breath Sounds / Voice Sounds] : lungs were clear to auscultation bilaterally [Heart Rate And Rhythm] : heart rate was normal and rhythm regular [Heart Sounds] : normal S1 and S2 [Heart Sounds Gallop] : no gallops [Murmurs] : no murmurs [Heart Sounds Pericardial Friction Rub] : no pericardial rub [Full Pulse] : the pedal pulses are present [Edema] : there was no peripheral edema [Bowel Sounds] : normal bowel sounds [Abdomen Soft] : soft [Abdomen Tenderness] : non-tender [Abdomen Mass (___ Cm)] : no abdominal mass palpated [Normal Sphincter Tone] : normal sphincter tone [No Rectal Mass] : no rectal mass [Occult Blood Positive] : stool was negative for occult blood [FreeTextEntry1] : pruritis ani [Cervical Lymph Nodes Enlarged Posterior Bilaterally] : posterior cervical [Cervical Lymph Nodes Enlarged Anterior Bilaterally] : anterior cervical [Supraclavicular Lymph Nodes Enlarged Bilaterally] : supraclavicular [Axillary Lymph Nodes Enlarged Bilaterally] : axillary [Femoral Lymph Nodes Enlarged Bilaterally] : femoral [Inguinal Lymph Nodes Enlarged Bilaterally] : inguinal [No CVA Tenderness] : no ~M costovertebral angle tenderness [No Spinal Tenderness] : no spinal tenderness [Abnormal Walk] : normal gait [Nail Clubbing] : no clubbing  or cyanosis of the fingernails [Musculoskeletal - Swelling] : no joint swelling seen [Motor Tone] : muscle strength and tone were normal [Skin Color & Pigmentation] : normal skin color and pigmentation [Skin Turgor] : normal skin turgor [] : no rash [Deep Tendon Reflexes (DTR)] : deep tendon reflexes were 2+ and symmetric [Sensation] : the sensory exam was normal to light touch and pinprick [No Focal Deficits] : no focal deficits [Oriented To Time, Place, And Person] : oriented to person, place, and time [Impaired Insight] : insight and judgment were intact [Affect] : the affect was normal

## 2020-10-27 NOTE — HISTORY OF PRESENT ILLNESS
[FreeTextEntry1] : The patient is using steroid cream for pruritus ani he feels quite well using it. One time daily. He has some back discomfort. He feels may be related to urinary symptoms. He reports a recent PSA being within normal limits. He moves his bowels 3 or 4 times a week, possibly with mild constipation

## 2020-10-27 NOTE — ASSESSMENT
[FreeTextEntry1] : #1 pruritus, and I will continue using cream p.r.n.\par #2 mild constipation. Suggest adding Metamucil and if any urinary symptoms persist did discuss with Dr. Del GEE

## 2022-01-18 ENCOUNTER — NON-APPOINTMENT (OUTPATIENT)
Age: 67
End: 2022-01-18

## 2022-01-18 ENCOUNTER — APPOINTMENT (OUTPATIENT)
Dept: ELECTROPHYSIOLOGY | Facility: CLINIC | Age: 67
End: 2022-01-18
Payer: COMMERCIAL

## 2022-01-18 VITALS — HEART RATE: 61 BPM | WEIGHT: 202 LBS | OXYGEN SATURATION: 97 % | BODY MASS INDEX: 29.92 KG/M2 | HEIGHT: 69 IN

## 2022-01-18 VITALS — SYSTOLIC BLOOD PRESSURE: 167 MMHG | DIASTOLIC BLOOD PRESSURE: 101 MMHG

## 2022-01-18 DIAGNOSIS — Z23 ENCOUNTER FOR IMMUNIZATION: ICD-10-CM

## 2022-01-18 PROCEDURE — 99204 OFFICE O/P NEW MOD 45 MIN: CPT

## 2022-01-18 PROCEDURE — 93000 ELECTROCARDIOGRAM COMPLETE: CPT

## 2022-01-18 RX ORDER — SUCRALFATE 1 G/10ML
1 SUSPENSION ORAL 4 TIMES DAILY
Qty: 560 | Refills: 3 | Status: COMPLETED | COMMUNITY
Start: 2019-12-27 | End: 2022-01-18

## 2022-01-18 RX ORDER — ATORVASTATIN CALCIUM 10 MG/1
10 TABLET, FILM COATED ORAL
Refills: 0 | Status: COMPLETED | COMMUNITY
Start: 2022-01-18 | End: 2022-01-18

## 2022-01-18 RX ORDER — CHLORDIAZEPOXIDE HYDROCHLORIDE AND CLIDINIUM BROMIDE 5; 2.5 MG/1; MG/1
5-2.5 CAPSULE, GELATIN COATED ORAL TWICE DAILY
Qty: 120 | Refills: 1 | Status: COMPLETED | COMMUNITY
Start: 2020-01-09 | End: 2022-01-18

## 2022-01-18 RX ORDER — LISINOPRIL 30 MG/1
TABLET ORAL
Refills: 0 | Status: COMPLETED | COMMUNITY
End: 2022-01-18

## 2022-01-28 ENCOUNTER — NON-APPOINTMENT (OUTPATIENT)
Age: 67
End: 2022-01-28

## 2022-02-04 ENCOUNTER — APPOINTMENT (OUTPATIENT)
Dept: ELECTROPHYSIOLOGY | Facility: CLINIC | Age: 67
End: 2022-02-04
Payer: COMMERCIAL

## 2022-02-04 VITALS
HEIGHT: 69 IN | HEART RATE: 59 BPM | WEIGHT: 202 LBS | BODY MASS INDEX: 29.92 KG/M2 | SYSTOLIC BLOOD PRESSURE: 185 MMHG | DIASTOLIC BLOOD PRESSURE: 98 MMHG | RESPIRATION RATE: 14 BRPM | OXYGEN SATURATION: 98 %

## 2022-02-04 DIAGNOSIS — Z78.9 OTHER SPECIFIED HEALTH STATUS: ICD-10-CM

## 2022-02-04 PROCEDURE — 99214 OFFICE O/P EST MOD 30 MIN: CPT

## 2022-02-04 RX ORDER — ATENOLOL 50 MG/1
TABLET ORAL
Refills: 0 | Status: DISCONTINUED | COMMUNITY
End: 2022-02-04

## 2022-02-04 RX ORDER — HYDROCORTISONE ACETATE AND PRAMOXINE HYDROCHLORIDE 25; 10 MG/G; MG/G
2.5-1 CREAM TOPICAL
Qty: 1 | Refills: 1 | Status: DISCONTINUED | COMMUNITY
Start: 2020-08-27 | End: 2022-02-04

## 2022-02-04 RX ORDER — CLOTRIMAZOLE AND BETAMETHASONE DIPROPIONATE 10; .5 MG/G; MG/G
1-0.05 CREAM TOPICAL TWICE DAILY
Qty: 2 | Refills: 1 | Status: DISCONTINUED | COMMUNITY
Start: 2020-08-27 | End: 2022-02-04

## 2022-02-04 RX ORDER — FENOFIBRATE 54 MG/1
54 TABLET ORAL
Refills: 0 | Status: DISCONTINUED | COMMUNITY
End: 2022-02-04

## 2022-02-10 ENCOUNTER — APPOINTMENT (OUTPATIENT)
Dept: ELECTROPHYSIOLOGY | Facility: CLINIC | Age: 67
End: 2022-02-10
Payer: COMMERCIAL

## 2022-02-10 VITALS
HEART RATE: 67 BPM | OXYGEN SATURATION: 98 % | HEIGHT: 69 IN | RESPIRATION RATE: 14 BRPM | WEIGHT: 202 LBS | BODY MASS INDEX: 29.92 KG/M2 | SYSTOLIC BLOOD PRESSURE: 173 MMHG | DIASTOLIC BLOOD PRESSURE: 111 MMHG

## 2022-02-10 VITALS — DIASTOLIC BLOOD PRESSURE: 98 MMHG | SYSTOLIC BLOOD PRESSURE: 158 MMHG

## 2022-02-10 PROCEDURE — 99212 OFFICE O/P EST SF 10 MIN: CPT

## 2022-02-10 RX ORDER — ATENOLOL 50 MG/1
50 TABLET ORAL DAILY
Refills: 0 | Status: DISCONTINUED | COMMUNITY
Start: 2022-02-09 | End: 2022-02-10

## 2022-02-10 NOTE — ASSESSMENT
[FreeTextEntry1] : Manual BP taken by myself is 158/98.\par He is instructed to discontinue Atenolol.\par Continue Propafenone  mg BID.\par He will take Amlodipine 5 mg daily, this was ordered to his pharmacy. ( he was on in the past but has none left)\par He will continue Lisinopril HCT 20/12.5 and continue Xarelto.\par He is scheduled to have ILR placed next week as outpatient.\par Plan discussed with Dr. Lizama.

## 2022-02-10 NOTE — HISTORY OF PRESENT ILLNESS
[FreeTextEntry1] : This is a 66 yr old male with PMHx of Atrial fibrillation s/p ablation x 2 10-15 yrs ago.\par He has recently had recurrence of PAF and this was documented on MCOT, he is now on Xarelto and Atenolol.\par He saw Dr. Lizama in January who prescribed Propafenone but the patient's pharmacy needed to order it and he just started it this week.  He feels as if his BP is much higher and presents for BP check.\par

## 2022-02-16 ENCOUNTER — OUTPATIENT (OUTPATIENT)
Dept: OUTPATIENT SERVICES | Facility: HOSPITAL | Age: 67
LOS: 1 days | Discharge: HOME | End: 2022-02-16
Payer: COMMERCIAL

## 2022-02-16 VITALS
RESPIRATION RATE: 18 BRPM | HEART RATE: 86 BPM | OXYGEN SATURATION: 97 % | DIASTOLIC BLOOD PRESSURE: 84 MMHG | TEMPERATURE: 97 F | SYSTOLIC BLOOD PRESSURE: 131 MMHG

## 2022-02-16 VITALS
DIASTOLIC BLOOD PRESSURE: 80 MMHG | TEMPERATURE: 97 F | WEIGHT: 201.94 LBS | HEIGHT: 69 IN | HEART RATE: 93 BPM | OXYGEN SATURATION: 100 % | SYSTOLIC BLOOD PRESSURE: 110 MMHG | RESPIRATION RATE: 18 BRPM

## 2022-02-16 DIAGNOSIS — I48.0 PAROXYSMAL ATRIAL FIBRILLATION: ICD-10-CM

## 2022-02-16 PROCEDURE — C1764: CPT

## 2022-02-16 PROCEDURE — 33285 INSJ SUBQ CAR RHYTHM MNTR: CPT

## 2022-02-16 RX ORDER — ATORVASTATIN CALCIUM 80 MG/1
1 TABLET, FILM COATED ORAL
Qty: 0 | Refills: 0 | DISCHARGE

## 2022-02-16 RX ORDER — ASPIRIN/CALCIUM CARB/MAGNESIUM 324 MG
1 TABLET ORAL
Qty: 0 | Refills: 0 | DISCHARGE

## 2022-02-16 RX ORDER — FENOFIBRATE,MICRONIZED 130 MG
1 CAPSULE ORAL
Qty: 0 | Refills: 0 | DISCHARGE

## 2022-02-16 RX ORDER — RIVAROXABAN 15 MG-20MG
1 KIT ORAL
Qty: 0 | Refills: 0 | DISCHARGE

## 2022-02-16 RX ORDER — AMLODIPINE BESYLATE 2.5 MG/1
1 TABLET ORAL
Qty: 0 | Refills: 0 | DISCHARGE

## 2022-02-16 RX ORDER — OMEPRAZOLE 10 MG/1
1 CAPSULE, DELAYED RELEASE ORAL
Qty: 0 | Refills: 0 | DISCHARGE

## 2022-02-16 RX ORDER — LISINOPRIL/HYDROCHLOROTHIAZIDE 10-12.5 MG
1 TABLET ORAL
Qty: 0 | Refills: 0 | DISCHARGE

## 2022-02-16 RX ORDER — CHOLECALCIFEROL (VITAMIN D3) 125 MCG
1 CAPSULE ORAL
Qty: 0 | Refills: 0 | DISCHARGE

## 2022-02-16 NOTE — ASU PATIENT PROFILE, ADULT - FALL HARM RISK - UNIVERSAL INTERVENTIONS
Bed in lowest position, wheels locked, appropriate side rails in place/Call bell, personal items and telephone in reach/Instruct patient to call for assistance before getting out of bed or chair/Non-slip footwear when patient is out of bed/[place] to call system/Physically safe environment - no spills, clutter or unnecessary equipment/Purposeful Proactive Rounding/Room/bathroom lighting operational, light cord in reach

## 2022-02-16 NOTE — ASU PATIENT PROFILE, ADULT - VISION (WITH CORRECTIVE LENSES IF THE PATIENT USUALLY WEARS THEM):
uses glasses for everyday/Partially impaired: cannot see medication labels or newsprint, but can see obstacles in path, and the surrounding layout; can count fingers at arm's length

## 2022-02-16 NOTE — PACU DISCHARGE NOTE - COMMENTS
Discharge instructions given to patient. Answered all questions. Verbalized understanding. Denies pain. Device instructions cellphone Cesia set up done with Vance Finney, Tressa Rep.

## 2022-02-17 ENCOUNTER — APPOINTMENT (OUTPATIENT)
Dept: ELECTROPHYSIOLOGY | Facility: CLINIC | Age: 67
End: 2022-02-17
Payer: COMMERCIAL

## 2022-02-17 PROCEDURE — 99213 OFFICE O/P EST LOW 20 MIN: CPT

## 2022-02-20 DIAGNOSIS — I48.91 UNSPECIFIED ATRIAL FIBRILLATION: ICD-10-CM

## 2022-02-20 DIAGNOSIS — Z79.01 LONG TERM (CURRENT) USE OF ANTICOAGULANTS: ICD-10-CM

## 2022-02-20 DIAGNOSIS — I10 ESSENTIAL (PRIMARY) HYPERTENSION: ICD-10-CM

## 2022-02-20 DIAGNOSIS — K21.9 GASTRO-ESOPHAGEAL REFLUX DISEASE WITHOUT ESOPHAGITIS: ICD-10-CM

## 2022-03-01 ENCOUNTER — NON-APPOINTMENT (OUTPATIENT)
Age: 67
End: 2022-03-01

## 2022-03-01 ENCOUNTER — APPOINTMENT (OUTPATIENT)
Dept: ELECTROPHYSIOLOGY | Facility: CLINIC | Age: 67
End: 2022-03-01
Payer: COMMERCIAL

## 2022-03-01 VITALS
SYSTOLIC BLOOD PRESSURE: 132 MMHG | DIASTOLIC BLOOD PRESSURE: 87 MMHG | HEIGHT: 69 IN | BODY MASS INDEX: 28.88 KG/M2 | WEIGHT: 195 LBS | HEART RATE: 81 BPM | OXYGEN SATURATION: 98 %

## 2022-03-01 PROCEDURE — 99213 OFFICE O/P EST LOW 20 MIN: CPT

## 2022-03-01 PROCEDURE — 93285 PRGRMG DEV EVAL SCRMS IP: CPT

## 2022-03-01 RX ORDER — PROPAFENONE 225 MG/1
225 CAPSULE, EXTENDED RELEASE ORAL
Qty: 180 | Refills: 1 | Status: DISCONTINUED | COMMUNITY
Start: 2022-02-04 | End: 2022-03-01

## 2022-03-01 NOTE — HISTORY OF PRESENT ILLNESS
[FreeTextEntry1] : This is a 66-year-old gentleman who presents for evaluation of palpitations he has a history of paroxysmal atrial fibrillation saw a Cardiologist 10-15 years ago and had Ablation x 2.  He also has a history of TIA/CVAs.  He reports his apple watch demonstrates frequent A. fib arrhythmia alerts. ART ACOSTA  denies chest pain, chest pressure, shortness of breath, lightheadedness, dizziness syncope, presyncope, orthopnea, PND, or edema. \par

## 2022-03-01 NOTE — ASSESSMENT
[FreeTextEntry1] : This is a 66-year-old gentleman with a history of paroxysmal atrial fibrillation status post ablations in the past who presents with palpitations a history of recurrent TIAs and his apple watch suggesting atrial fibrillation.  Discussed the options including a wearable monitor and implantable cardiac loop recorder monitor.  He would like to start with the wearable monitor but is agreeable to pursue a implanted monitor if deemed necessary\par \par Note from Dr. Phillips reviewed.

## 2022-04-08 NOTE — HISTORY OF PRESENT ILLNESS
[FreeTextEntry1] : This is a 66-year-old gentleman who presents for evaluation of palpitations he has a history of paroxysmal atrial fibrillation saw a Cardiologist 10-15 years ago and had Ablation x 2.  He also has a history of TIA/CVAs.  He reports his apple watch demonstrates frequent A. fib arrhythmia alerts. ART DAVE  denies chest pain, chest pressure, shortness of breath, lightheadedness, dizziness syncope, presyncope, orthopnea, PND, or edema.  He wore a MCOT. \par

## 2022-04-08 NOTE — ASSESSMENT
[FreeTextEntry1] : This is a 66-year-old gentleman with a history of paroxysmal atrial fibrillation status post ablations in the past who presents with palpitations a history of recurrent TIAs and his apple watch suggesting atrial fibrillation.  Discussed   implantable cardiac loop recorder monitor.  \par \par MCOT results reviewed with patient  will proceed with Implantable Cardiac monitor. \par \par \par During this visit, ART DAVE  and I had a comprehensive discussion of arrhythmia management using principles of shared decision-making. This included a discussion of anti-arrhythmic medications including class I agents (e.g. flecainide), class III agents (e.g. amiodarone, dofetilide, sotalol, etc.), and both class II and IV agents (beta-blockers and calcium channel blockers). We reviewed the potentially life-threatening side effects of these medications, including (but not limited to) fatal tachyarrhythmias, pulmonary toxicity, liver toxicity, thyroid disorders. We also reviewed the requisite monitoring required of some of these medications. In addition, we reviewed ablative therapy, including the potential benefits and risks of catheter ablation. These risks include death, myocardial infarction, stroke, cardiac perforation, vascular injury, and other less severe complications. Mr. ACOSTA  demonstrated a clear understanding of these issues during our discussion.  He dfoes not wish to pursue EP study will start propafenone as below.  \par

## 2022-04-28 ENCOUNTER — APPOINTMENT (OUTPATIENT)
Dept: ELECTROPHYSIOLOGY | Facility: CLINIC | Age: 67
End: 2022-04-28
Payer: COMMERCIAL

## 2022-04-29 ENCOUNTER — NON-APPOINTMENT (OUTPATIENT)
Age: 67
End: 2022-04-29

## 2022-04-29 PROCEDURE — 93298 REM INTERROG DEV EVAL SCRMS: CPT

## 2022-04-29 PROCEDURE — G2066: CPT

## 2022-06-05 ENCOUNTER — APPOINTMENT (OUTPATIENT)
Dept: ELECTROPHYSIOLOGY | Facility: CLINIC | Age: 67
End: 2022-06-05
Payer: COMMERCIAL

## 2022-06-06 ENCOUNTER — NON-APPOINTMENT (OUTPATIENT)
Age: 67
End: 2022-06-06

## 2022-06-06 PROCEDURE — G2066: CPT

## 2022-06-06 PROCEDURE — 93298 REM INTERROG DEV EVAL SCRMS: CPT

## 2022-07-10 ENCOUNTER — APPOINTMENT (OUTPATIENT)
Dept: ELECTROPHYSIOLOGY | Facility: CLINIC | Age: 67
End: 2022-07-10

## 2022-07-11 ENCOUNTER — NON-APPOINTMENT (OUTPATIENT)
Age: 67
End: 2022-07-11

## 2022-07-11 PROCEDURE — 93298 REM INTERROG DEV EVAL SCRMS: CPT

## 2022-07-11 PROCEDURE — G2066: CPT

## 2022-07-21 ENCOUNTER — RX RENEWAL (OUTPATIENT)
Age: 67
End: 2022-07-21

## 2022-08-15 ENCOUNTER — APPOINTMENT (OUTPATIENT)
Dept: ELECTROPHYSIOLOGY | Facility: CLINIC | Age: 67
End: 2022-08-15

## 2022-08-15 ENCOUNTER — NON-APPOINTMENT (OUTPATIENT)
Age: 67
End: 2022-08-15

## 2022-08-15 PROCEDURE — G2066: CPT

## 2022-08-15 PROCEDURE — 93298 REM INTERROG DEV EVAL SCRMS: CPT

## 2022-09-27 ENCOUNTER — APPOINTMENT (OUTPATIENT)
Dept: ELECTROPHYSIOLOGY | Facility: CLINIC | Age: 67
End: 2022-09-27

## 2022-09-27 VITALS
WEIGHT: 175 LBS | DIASTOLIC BLOOD PRESSURE: 95 MMHG | SYSTOLIC BLOOD PRESSURE: 176 MMHG | HEART RATE: 92 BPM | RESPIRATION RATE: 15 BRPM | OXYGEN SATURATION: 97 % | HEIGHT: 69 IN | BODY MASS INDEX: 25.92 KG/M2

## 2022-09-27 PROCEDURE — 99211 OFF/OP EST MAY X REQ PHY/QHP: CPT

## 2022-09-27 RX ORDER — CHOLECALCIFEROL (VITAMIN D3) 1250 MCG
1.25 MG CAPSULE ORAL
Qty: 12 | Refills: 0 | Status: DISCONTINUED | COMMUNITY
Start: 2021-11-26 | End: 2022-09-27

## 2022-09-27 NOTE — HISTORY OF PRESENT ILLNESS
[FreeTextEntry1] : 67 year old male with history of PAF, ILR who presents for routine ILR follow up.  Per patient, he had episode on 8/14/22 while his was staying in [facility] where he "passed out" and fell injuring himself.  Has no recollection of events, had been drinking.  No further events. Denies any CP, palpitations, SOB, dizziness, lightheadedness.  Ran out of Propafenone and had PCP refill med but at lower dose.

## 2022-09-27 NOTE — DISCUSSION/SUMMARY
[FreeTextEntry1] : Refill Propafenone 325mg BID\par Continue monthly remote monitoring\par In office follow up in six months

## 2022-09-27 NOTE — ASSESSMENT
[FreeTextEntry1] : ILR interrogation revealed 7 episodes of PAF with longest episodes lasting 6hr,38mins on 9/20/22.\par No events correlating to episode of 8/14.

## 2022-10-24 ENCOUNTER — APPOINTMENT (OUTPATIENT)
Dept: NEUROLOGY | Facility: CLINIC | Age: 67
End: 2022-10-24

## 2022-10-24 ENCOUNTER — NON-APPOINTMENT (OUTPATIENT)
Age: 67
End: 2022-10-24

## 2022-10-24 VITALS
SYSTOLIC BLOOD PRESSURE: 129 MMHG | WEIGHT: 177 LBS | HEIGHT: 69 IN | BODY MASS INDEX: 26.22 KG/M2 | HEART RATE: 81 BPM | DIASTOLIC BLOOD PRESSURE: 75 MMHG

## 2022-10-24 DIAGNOSIS — G47.33 OBSTRUCTIVE SLEEP APNEA (ADULT) (PEDIATRIC): ICD-10-CM

## 2022-10-24 PROCEDURE — 99244 OFF/OP CNSLTJ NEW/EST MOD 40: CPT

## 2022-10-24 NOTE — CONSULT LETTER
[Dear  ___] : Dear  [unfilled], [Consult Letter:] : I had the pleasure of evaluating your patient, [unfilled]. [Please see my note below.] : Please see my note below. [FreeTextEntry2] : Dr. Guillaume [FreeTextEntry3] : Sincerely,\par \par \par Lluvia Calderon MD\par Diplomate, American Academy of Psychiatry and Neurology\par Board Certified in the Subspecialty of Clinical Neurophysiology\par Board Certified in the Subspecialty of Sleep Medicine\par Board Certified in the Subspecialty of Epilepsy\par  [DrEstela  ___] : Dr. SCHMITZ

## 2022-10-24 NOTE — DISCUSSION/SUMMARY
[FreeTextEntry1] : Mr. Bae is a 67 year old man with a past history of obstructive sleep apnea. \par He was unable to tolerate CPAP previously.\par He has lost 30 pounds over the last 6 months but still reports unrefreshing sleep and daytime sleepiness and fatigue.\par \par His history is suggestive of obstructive sleep apnea.\par We discussed the risks of untreated sleep apnea including sleepiness, hypertension, increased risk for heart disease and stroke, worsening seizure frequency and cognitive impairment.\par I am ordering a home sleep study to clarify whether he has LENNY and to what degree.\par \par If he has LENNY, treatment options will be discussed. I explained that there have been several improvements in CPAP masks since he last tried and he may be successful with CPAP use now.\par \par If he does not have evidence of obstructive sleep apnea, we will focus more on sleep duration and finding ways to improve continuity of sleep.\par We discussed the following recommendations to improve sleep hygiene and insomnia.\par - The bed should only be used for sleep and intimacy. No reading or watching television in bed.\par -  Avoid trying to sleep/go to bed only when sleepy. If you cannot fall asleep at the beginning of the night or in the middle of the night get out of bed after 15 minutes and do something relaxing (read, watch television, etc.)\par -  Wake up at the same time every day.\par -  No naps during the day\par -  No caffeine after noon \par -  Do not watch the clock at night.\par - Do not use the computer/tablets for 1-2 hours prior to bedtime\par - Schedule thinking time early in the evening and have relaxation time for one hour before bed\par - Practice relaxation training that can be done at night if you cannot sleep\par \par I will follow-up with Mr. Bae after his sleep study, sooner if needed.\par \par

## 2022-10-24 NOTE — HISTORY OF PRESENT ILLNESS
[FreeTextEntry1] : Mr. Bae presents today for neurology evaluation.\par He reports that about 15 years ago he was diagnosed with obstructive sleep apnea.\par He went back for a CPAP titration study and was not able to tolerate the study with the mask.\par \par He has a history of atrial fibrillation. He has had two ablations.\par \par He sleeps for 6-8 hours per night but does not feel rested when he wakes up.\par He feels ready to crash by noon. Naps are also not refreshing.\par He drinks reduced caffeine coffee - about two cups per day. The latest he drinks coffee is a t2 PM.\par \par He sleeps alone.\par He is not aware of snoring. \par He does not wake up feeling like he is choking or gasping for air. \par He wakes up about 4 times per night on average. Sometimes he has difficulty falling back to sleep.\par He generally sleeps on his right side.\par He often sleeps on his couch.\par He rarely wakes up with a sore throat, dry mouth or headache.\par \par He lost 30 pounds intentionally over the last six months but still does not feel better.\par \par He has a stressful job and often finds himself thinking too much upon awakening.\par \par He denies symptoms of RLS.\par \par There is no history of dream enactment behavior, hypnagogic/hypnopompic hallucinations, sleep paralysis or cataplexy.\par \par His [place] Sleepiness Scale Score is 9/24.

## 2022-10-24 NOTE — PHYSICAL EXAM
[FreeTextEntry1] : Examination:\par Constitutional: normal, no apparent distress\par oropharynx: patent\par Eyes: normal conjunctiva b/l, no ptosis, visual fields full\par Respiratory: no respiratory distress, normal effort, normal auscultation\par Cardiovascular: normal rate, rhythm, no murmurs\par Neck: supple, no masses\par Vascular: carotids normal\par Skin: normal color, no rashes\par Psych: normal mood, affect\par \par Neurological:\par Memory: normal memory, oriented to person, place, time\par Language intact/no aphasia\par Cranial Nerves: II-XII intact, Pupils equally round and reactive to light, ocular muscles/movements intact, no ptosis, no facial weakness, tongue protrudes normally in the midline, \par Motor: normal tone, no pronator drift, full strength in all four extremities in the proximal and distal muscle groups\par Coordination: Fine motor movements intact, rapid alternating movements intact, finger to nose intact bilaterally\par Sensory: intact to light touch\par DTRs: symmetric, 2+ in b/l triceps, 2+ in b/l biceps, 2+ in b/l brachioradialis, 2+ in bilateral patellars\par Gait: narrow based, steady

## 2022-10-27 ENCOUNTER — APPOINTMENT (OUTPATIENT)
Dept: NEUROLOGY | Facility: CLINIC | Age: 67
End: 2022-10-27

## 2022-10-27 PROCEDURE — 95806 SLEEP STUDY UNATT&RESP EFFT: CPT

## 2022-11-01 ENCOUNTER — NON-APPOINTMENT (OUTPATIENT)
Age: 67
End: 2022-11-01

## 2022-11-01 ENCOUNTER — APPOINTMENT (OUTPATIENT)
Dept: ELECTROPHYSIOLOGY | Facility: CLINIC | Age: 67
End: 2022-11-01

## 2022-11-01 PROCEDURE — 93298 REM INTERROG DEV EVAL SCRMS: CPT

## 2022-11-01 PROCEDURE — G2066: CPT

## 2022-12-05 ENCOUNTER — NON-APPOINTMENT (OUTPATIENT)
Age: 67
End: 2022-12-05

## 2022-12-05 ENCOUNTER — APPOINTMENT (OUTPATIENT)
Dept: ELECTROPHYSIOLOGY | Facility: CLINIC | Age: 67
End: 2022-12-05

## 2022-12-05 PROCEDURE — 93298 REM INTERROG DEV EVAL SCRMS: CPT

## 2022-12-05 PROCEDURE — G2066: CPT

## 2023-01-03 ENCOUNTER — RX RENEWAL (OUTPATIENT)
Age: 68
End: 2023-01-03

## 2023-01-09 ENCOUNTER — APPOINTMENT (OUTPATIENT)
Dept: ELECTROPHYSIOLOGY | Facility: CLINIC | Age: 68
End: 2023-01-09
Payer: COMMERCIAL

## 2023-01-09 ENCOUNTER — NON-APPOINTMENT (OUTPATIENT)
Age: 68
End: 2023-01-09

## 2023-01-09 PROCEDURE — G2066: CPT

## 2023-01-09 PROCEDURE — 93298 REM INTERROG DEV EVAL SCRMS: CPT

## 2023-02-13 ENCOUNTER — NON-APPOINTMENT (OUTPATIENT)
Age: 68
End: 2023-02-13

## 2023-02-13 ENCOUNTER — APPOINTMENT (OUTPATIENT)
Dept: ELECTROPHYSIOLOGY | Facility: CLINIC | Age: 68
End: 2023-02-13
Payer: COMMERCIAL

## 2023-02-13 PROCEDURE — G2066: CPT

## 2023-02-13 PROCEDURE — 93298 REM INTERROG DEV EVAL SCRMS: CPT

## 2023-03-28 ENCOUNTER — NON-APPOINTMENT (OUTPATIENT)
Age: 68
End: 2023-03-28

## 2023-03-28 ENCOUNTER — APPOINTMENT (OUTPATIENT)
Dept: ELECTROPHYSIOLOGY | Facility: CLINIC | Age: 68
End: 2023-03-28
Payer: COMMERCIAL

## 2023-03-28 VITALS
WEIGHT: 184 LBS | HEART RATE: 81 BPM | SYSTOLIC BLOOD PRESSURE: 139 MMHG | RESPIRATION RATE: 16 BRPM | HEIGHT: 69 IN | OXYGEN SATURATION: 98 % | DIASTOLIC BLOOD PRESSURE: 91 MMHG | BODY MASS INDEX: 27.25 KG/M2

## 2023-03-28 PROCEDURE — 93285 PRGRMG DEV EVAL SCRMS IP: CPT

## 2023-03-28 PROCEDURE — 99212 OFFICE O/P EST SF 10 MIN: CPT

## 2023-03-28 NOTE — REASON FOR VISIT
[FreeTextEntry1] : 66 yo M with h/o PAF on propafenone & xarelto, s/p ILR, here for 6months ILR interrogation & reprogramming.

## 2023-05-02 ENCOUNTER — NON-APPOINTMENT (OUTPATIENT)
Age: 68
End: 2023-05-02

## 2023-05-02 ENCOUNTER — APPOINTMENT (OUTPATIENT)
Dept: ELECTROPHYSIOLOGY | Facility: CLINIC | Age: 68
End: 2023-05-02
Payer: COMMERCIAL

## 2023-05-02 PROCEDURE — 93298 REM INTERROG DEV EVAL SCRMS: CPT

## 2023-05-02 PROCEDURE — G2066: CPT

## 2023-05-12 ENCOUNTER — APPOINTMENT (OUTPATIENT)
Dept: ELECTROPHYSIOLOGY | Facility: CLINIC | Age: 68
End: 2023-05-12
Payer: COMMERCIAL

## 2023-05-12 VITALS
BODY MASS INDEX: 27.25 KG/M2 | DIASTOLIC BLOOD PRESSURE: 83 MMHG | HEART RATE: 79 BPM | OXYGEN SATURATION: 95 % | WEIGHT: 184 LBS | HEIGHT: 69 IN | SYSTOLIC BLOOD PRESSURE: 119 MMHG

## 2023-05-12 DIAGNOSIS — I10 ESSENTIAL (PRIMARY) HYPERTENSION: ICD-10-CM

## 2023-05-12 PROCEDURE — 99204 OFFICE O/P NEW MOD 45 MIN: CPT

## 2023-05-12 PROCEDURE — 93285 PRGRMG DEV EVAL SCRMS IP: CPT

## 2023-05-12 PROCEDURE — 99214 OFFICE O/P EST MOD 30 MIN: CPT

## 2023-05-12 RX ORDER — ATORVASTATIN CALCIUM 10 MG/1
10 TABLET, FILM COATED ORAL
Qty: 90 | Refills: 0 | Status: ACTIVE | COMMUNITY
Start: 2022-01-03

## 2023-05-12 RX ORDER — LISINOPRIL AND HYDROCHLOROTHIAZIDE TABLETS 20; 12.5 MG/1; MG/1
20-12.5 TABLET ORAL
Qty: 90 | Refills: 0 | Status: ACTIVE | COMMUNITY
Start: 2022-01-03

## 2023-05-12 RX ORDER — FENOFIBRATE 160 MG/1
160 TABLET ORAL
Qty: 90 | Refills: 0 | Status: ACTIVE | COMMUNITY
Start: 2022-01-03

## 2023-05-12 RX ORDER — ASPIRIN 81 MG/1
81 TABLET ORAL
Refills: 0 | Status: ACTIVE | COMMUNITY
Start: 2022-01-18

## 2023-05-12 NOTE — HISTORY OF PRESENT ILLNESS
[FreeTextEntry1] : 67-year-old male with history of PAF prior ablation x2, TIA, HTN, mild LENNY, status post ILR for A-fib monitoring.  Patient has been on Xarelto 20 mg daily, also takes ASA 81 mg daily. He has recurrent pAF and remains on propafenone 325mg bid. He has recurrent slow GI bleed often brbpr or dark stool. He has occasional palpitations. he drinks alcohol few drinks x3 per week.\par Denies cp, sob, syncope.\par chadsvasc score 4 (age-1, htn, 1-, cva-2)\par ILR today shows SR 80 bpm, increase AF burden to 14%, few days AF up to 24h

## 2023-05-12 NOTE — DISCUSSION/SUMMARY
[FreeTextEntry1] : 67-year-old male with history of PAF who had 2 catheter ablation procedures last 1 to 15 years ago hypertension mild LENNY TIA\par #PAF increase in burden despite being on propafenone 325 mg twice daily symptomatic with palpitations we discussed options of changing to another antiarrhythmic drug such as Tikosyn or sotalol or consider redo A-fib ablation since he may have some reconnection of PVI posterior wall considering last ablation was done 15 years ago.  Discussed at great length I/RB/A of catheter ablation procedure and all questions were answered and patient will think about treatment and let us know.\par #Lower GI bleed while compliant with Xarelto 20 mg daily it appears the patient may have hemorrhoids he was advised to follow-up with GI may need colonoscopy and if deemed to be hemorrhoid bleed should undergo surgery.  If patient continues to have recurrent bleed he may benefit from CORNELIUS occlusion watchman implant in the near future.\par #TTE for LV function and LA size\par \par \par Total length of time spent with this patient was 45 minutes and more then half of the time was spent face to face with the patient as well as counseling and coordination of care as stated above.\par

## 2023-06-07 ENCOUNTER — APPOINTMENT (OUTPATIENT)
Dept: NEUROLOGY | Facility: CLINIC | Age: 68
End: 2023-06-07

## 2023-06-16 ENCOUNTER — NON-APPOINTMENT (OUTPATIENT)
Age: 68
End: 2023-06-16

## 2023-06-16 ENCOUNTER — APPOINTMENT (OUTPATIENT)
Dept: ELECTROPHYSIOLOGY | Facility: CLINIC | Age: 68
End: 2023-06-16
Payer: COMMERCIAL

## 2023-06-16 PROCEDURE — G2066: CPT

## 2023-06-16 PROCEDURE — 93298 REM INTERROG DEV EVAL SCRMS: CPT

## 2023-07-21 ENCOUNTER — APPOINTMENT (OUTPATIENT)
Dept: ELECTROPHYSIOLOGY | Facility: CLINIC | Age: 68
End: 2023-07-21
Payer: COMMERCIAL

## 2023-07-21 ENCOUNTER — NON-APPOINTMENT (OUTPATIENT)
Age: 68
End: 2023-07-21

## 2023-07-21 PROCEDURE — 93298 REM INTERROG DEV EVAL SCRMS: CPT

## 2023-07-21 PROCEDURE — G2066: CPT

## 2023-08-25 ENCOUNTER — APPOINTMENT (OUTPATIENT)
Dept: ELECTROPHYSIOLOGY | Facility: CLINIC | Age: 68
End: 2023-08-25
Payer: COMMERCIAL

## 2023-08-25 VITALS
HEIGHT: 69 IN | RESPIRATION RATE: 16 BRPM | DIASTOLIC BLOOD PRESSURE: 89 MMHG | SYSTOLIC BLOOD PRESSURE: 139 MMHG | OXYGEN SATURATION: 96 % | WEIGHT: 190 LBS | BODY MASS INDEX: 28.14 KG/M2

## 2023-08-25 DIAGNOSIS — R55 SYNCOPE AND COLLAPSE: ICD-10-CM

## 2023-08-25 PROCEDURE — 99214 OFFICE O/P EST MOD 30 MIN: CPT | Mod: 25

## 2023-08-25 PROCEDURE — 93285 PRGRMG DEV EVAL SCRMS IP: CPT

## 2023-08-25 PROCEDURE — 93000 ELECTROCARDIOGRAM COMPLETE: CPT | Mod: 59

## 2023-08-25 NOTE — DISCUSSION/SUMMARY
[FreeTextEntry1] : 68-year-old male with history of PAF who had 2 catheter ablation procedures last one 15 years ago hypertension mild LENNY TIA #PAF improve in burden now 3% while on propafenone 325 mg twice daily, he reduced alcohol intake discussed options of changing to another antiarrhythmic drug such as Tikosyn or sotalol or consider redo A-fib ablation if he has more afib, he prefers to stay on AAD. Discussed at great length I/RB/A of other AAD and catheter ablation procedure and all questions were answered. cont xarelto 20mg daily #Bloody stool occasionally,  advised to follow-up with GI may need colonoscopy and if deemed to be hemorrhoid bleed should undergo surgery.  If patient continues to have recurrent bleed he may benefit from CORNELIUS occlusion watchman implant in the near future.  Total length of time spent with this patient was 45 minutes and more then half of the time was spent face to face with the patient as well as counseling and coordination of care as stated above.  abdomen

## 2023-08-25 NOTE — HISTORY OF PRESENT ILLNESS
[FreeTextEntry1] : 67-year-old male with history of PAF prior ablation x2, TIA, HTN, mild LENNY not complaint with CPAP, status post ILR linq II. Patient has been on Xarelto 20 mg daily, also takes ASA 81 mg daily. He has recurrent pAF and remains on propafenone 325mg bid. He has occasional blood in stool. He has occasional palpitations. He cut his alcohol intake. Denies cp, sob, syncope. chadsvasc score 4 (age-1, htn, 1-, cva-2) ILR today shows SR 75 bpm, AF burden is down to 3%, recent AF on 8.19.23 2am duration 3h, avg hr 88 bpm  Cardiac work-up: TTE from 6/2023 shows normal LVEF 60% mild left atrial enlargement

## 2023-09-29 ENCOUNTER — NON-APPOINTMENT (OUTPATIENT)
Age: 68
End: 2023-09-29

## 2023-09-29 ENCOUNTER — APPOINTMENT (OUTPATIENT)
Dept: ELECTROPHYSIOLOGY | Facility: CLINIC | Age: 68
End: 2023-09-29
Payer: COMMERCIAL

## 2023-09-29 PROCEDURE — G2066: CPT

## 2023-09-29 PROCEDURE — 93298 REM INTERROG DEV EVAL SCRMS: CPT

## 2023-10-02 ENCOUNTER — APPOINTMENT (OUTPATIENT)
Dept: ELECTROPHYSIOLOGY | Facility: CLINIC | Age: 68
End: 2023-10-02
Payer: COMMERCIAL

## 2023-10-02 ENCOUNTER — NON-APPOINTMENT (OUTPATIENT)
Age: 68
End: 2023-10-02

## 2023-10-02 VITALS
DIASTOLIC BLOOD PRESSURE: 88 MMHG | BODY MASS INDEX: 28.14 KG/M2 | WEIGHT: 190 LBS | HEART RATE: 70 BPM | HEIGHT: 69 IN | SYSTOLIC BLOOD PRESSURE: 140 MMHG | RESPIRATION RATE: 16 BRPM | OXYGEN SATURATION: 97 %

## 2023-10-02 PROCEDURE — 93290 INTERROG DEV EVAL ICPMS IP: CPT

## 2023-10-02 PROCEDURE — 99212 OFFICE O/P EST SF 10 MIN: CPT

## 2023-10-02 RX ORDER — PROPAFENONE 225 MG/1
225 CAPSULE, EXTENDED RELEASE ORAL
Qty: 180 | Refills: 3 | Status: ACTIVE | COMMUNITY
Start: 2023-10-02 | End: 1900-01-01

## 2023-10-02 RX ORDER — PROPAFENONE 325 MG/1
325 CAPSULE, EXTENDED RELEASE ORAL
Qty: 180 | Refills: 1 | Status: DISCONTINUED | COMMUNITY
Start: 2022-03-01 | End: 2023-10-02

## 2023-10-05 ENCOUNTER — APPOINTMENT (OUTPATIENT)
Dept: ELECTROPHYSIOLOGY | Facility: CLINIC | Age: 68
End: 2023-10-05

## 2023-10-25 ENCOUNTER — APPOINTMENT (OUTPATIENT)
Dept: UROLOGY | Facility: CLINIC | Age: 68
End: 2023-10-25
Payer: COMMERCIAL

## 2023-10-25 VITALS
DIASTOLIC BLOOD PRESSURE: 80 MMHG | OXYGEN SATURATION: 96 % | HEART RATE: 81 BPM | WEIGHT: 190 LBS | SYSTOLIC BLOOD PRESSURE: 120 MMHG | HEIGHT: 69 IN | RESPIRATION RATE: 15 BRPM | BODY MASS INDEX: 28.14 KG/M2

## 2023-10-25 DIAGNOSIS — R79.89 OTHER SPECIFIED ABNORMAL FINDINGS OF BLOOD CHEMISTRY: ICD-10-CM

## 2023-10-25 PROCEDURE — 99244 OFF/OP CNSLTJ NEW/EST MOD 40: CPT

## 2023-10-26 LAB
APPEARANCE: CLEAR
BACTERIA: NEGATIVE /HPF
BILIRUBIN URINE: ABNORMAL
BLOOD URINE: NEGATIVE
CAST: 1 /LPF
COLOR: NORMAL
EPITHELIAL CELLS: 1 /HPF
GLUCOSE QUALITATIVE U: NEGATIVE MG/DL
KETONES URINE: NEGATIVE MG/DL
LEUKOCYTE ESTERASE URINE: NEGATIVE
MICROSCOPIC-UA: NORMAL
NITRITE URINE: NEGATIVE
PH URINE: 6.5
PROTEIN URINE: NORMAL MG/DL
RED BLOOD CELLS URINE: 3 /HPF
SPECIFIC GRAVITY URINE: 1.02
UROBILINOGEN URINE: 1 MG/DL
WHITE BLOOD CELLS URINE: 0 /HPF

## 2023-10-27 LAB — BACTERIA UR CULT: NORMAL

## 2023-11-01 LAB — URINE CYTOLOGY: NORMAL

## 2023-11-02 ENCOUNTER — APPOINTMENT (OUTPATIENT)
Dept: ELECTROPHYSIOLOGY | Facility: CLINIC | Age: 68
End: 2023-11-02
Payer: COMMERCIAL

## 2023-11-03 ENCOUNTER — NON-APPOINTMENT (OUTPATIENT)
Age: 68
End: 2023-11-03

## 2023-11-03 PROCEDURE — G2066: CPT | Mod: NC

## 2023-11-03 PROCEDURE — 93298 REM INTERROG DEV EVAL SCRMS: CPT

## 2023-11-15 ENCOUNTER — APPOINTMENT (OUTPATIENT)
Dept: UROLOGY | Facility: CLINIC | Age: 68
End: 2023-11-15
Payer: COMMERCIAL

## 2023-11-15 VITALS
WEIGHT: 193 LBS | SYSTOLIC BLOOD PRESSURE: 107 MMHG | HEART RATE: 86 BPM | DIASTOLIC BLOOD PRESSURE: 71 MMHG | OXYGEN SATURATION: 95 % | RESPIRATION RATE: 16 BRPM | BODY MASS INDEX: 28.58 KG/M2 | HEIGHT: 69 IN

## 2023-11-15 DIAGNOSIS — N13.8 BENIGN PROSTATIC HYPERPLASIA WITH LOWER URINARY TRACT SYMPMS: ICD-10-CM

## 2023-11-15 DIAGNOSIS — N40.1 BENIGN PROSTATIC HYPERPLASIA WITH LOWER URINARY TRACT SYMPMS: ICD-10-CM

## 2023-11-15 PROCEDURE — 51741 ELECTRO-UROFLOWMETRY FIRST: CPT

## 2023-11-15 PROCEDURE — 76857 US EXAM PELVIC LIMITED: CPT

## 2023-11-15 PROCEDURE — 76872 US TRANSRECTAL: CPT

## 2023-11-15 PROCEDURE — 99212 OFFICE O/P EST SF 10 MIN: CPT | Mod: 25

## 2023-11-16 LAB
ALBUMIN SERPL ELPH-MCNC: 4.4 G/DL
ALP BLD-CCNC: 46 U/L
ALT SERPL-CCNC: 31 U/L
ANION GAP SERPL CALC-SCNC: 11 MMOL/L
AST SERPL-CCNC: 29 U/L
BILIRUB SERPL-MCNC: 0.6 MG/DL
BUN SERPL-MCNC: 18 MG/DL
CALCIUM SERPL-MCNC: 9.8 MG/DL
CHLORIDE SERPL-SCNC: 104 MMOL/L
CO2 SERPL-SCNC: 32 MMOL/L
CREAT SERPL-MCNC: 1.03 MG/DL
EGFR: 80 ML/MIN/1.73M2
GLUCOSE SERPL-MCNC: 111 MG/DL
POTASSIUM SERPL-SCNC: 4.3 MMOL/L
PROT SERPL-MCNC: 7 G/DL
SODIUM SERPL-SCNC: 147 MMOL/L

## 2023-11-22 ENCOUNTER — APPOINTMENT (OUTPATIENT)
Dept: ULTRASOUND IMAGING | Facility: CLINIC | Age: 68
End: 2023-11-22
Payer: COMMERCIAL

## 2023-11-22 ENCOUNTER — OUTPATIENT (OUTPATIENT)
Dept: OUTPATIENT SERVICES | Facility: HOSPITAL | Age: 68
LOS: 1 days | End: 2023-11-22
Payer: COMMERCIAL

## 2023-11-22 DIAGNOSIS — R79.89 OTHER SPECIFIED ABNORMAL FINDINGS OF BLOOD CHEMISTRY: ICD-10-CM

## 2023-11-22 PROCEDURE — 76770 US EXAM ABDO BACK WALL COMP: CPT | Mod: 26

## 2023-11-22 PROCEDURE — 76770 US EXAM ABDO BACK WALL COMP: CPT

## 2023-11-27 ENCOUNTER — NON-APPOINTMENT (OUTPATIENT)
Age: 68
End: 2023-11-27

## 2023-11-27 RX ORDER — TAMSULOSIN HYDROCHLORIDE 0.4 MG/1
0.4 CAPSULE ORAL
Qty: 90 | Refills: 3 | Status: ACTIVE | COMMUNITY
Start: 2023-10-25 | End: 1900-01-01

## 2023-12-06 ENCOUNTER — APPOINTMENT (OUTPATIENT)
Dept: GASTROENTEROLOGY | Facility: CLINIC | Age: 68
End: 2023-12-06
Payer: COMMERCIAL

## 2023-12-06 VITALS
WEIGHT: 195 LBS | SYSTOLIC BLOOD PRESSURE: 118 MMHG | BODY MASS INDEX: 28.88 KG/M2 | DIASTOLIC BLOOD PRESSURE: 82 MMHG | HEIGHT: 69 IN

## 2023-12-06 DIAGNOSIS — R53.83 OTHER FATIGUE: ICD-10-CM

## 2023-12-06 DIAGNOSIS — K62.5 HEMORRHAGE OF ANUS AND RECTUM: ICD-10-CM

## 2023-12-06 DIAGNOSIS — K76.89 OTHER SPECIFIED DISEASES OF LIVER: ICD-10-CM

## 2023-12-06 DIAGNOSIS — Z86.73 PERSONAL HISTORY OF TRANSIENT ISCHEMIC ATTACK (TIA), AND CEREBRAL INFARCTION W/OUT RESIDUAL DEFICITS: ICD-10-CM

## 2023-12-06 DIAGNOSIS — K59.00 CONSTIPATION, UNSPECIFIED: ICD-10-CM

## 2023-12-06 DIAGNOSIS — K63.5 POLYP OF COLON: ICD-10-CM

## 2023-12-06 DIAGNOSIS — R19.4 CHANGE IN BOWEL HABIT: ICD-10-CM

## 2023-12-06 DIAGNOSIS — Z12.11 ENCOUNTER FOR SCREENING FOR MALIGNANT NEOPLASM OF COLON: ICD-10-CM

## 2023-12-06 DIAGNOSIS — Z78.9 OTHER SPECIFIED HEALTH STATUS: ICD-10-CM

## 2023-12-06 DIAGNOSIS — R74.8 ABNORMAL LEVELS OF OTHER SERUM ENZYMES: ICD-10-CM

## 2023-12-06 DIAGNOSIS — K21.9 GASTRO-ESOPHAGEAL REFLUX DISEASE W/OUT ESOPHAGITIS: ICD-10-CM

## 2023-12-06 PROCEDURE — 99204 OFFICE O/P NEW MOD 45 MIN: CPT

## 2023-12-06 RX ORDER — MAGNESIUM HYDROXIDE 400 MG/5ML
1200 SUSPENSION, ORAL (FINAL DOSE FORM) ORAL
Qty: 1 | Refills: 0 | Status: ACTIVE | COMMUNITY
Start: 2023-12-06 | End: 1900-01-01

## 2023-12-06 RX ORDER — EAR PLUGS
EACH OTIC (EAR)
Qty: 90 | Refills: 0 | Status: ACTIVE | COMMUNITY
Start: 2023-12-06 | End: 1900-01-01

## 2023-12-06 RX ORDER — SODIUM SULFATE, POTASSIUM SULFATE AND MAGNESIUM SULFATE 1.6; 3.13; 17.5 G/177ML; G/177ML; G/177ML
17.5-3.13-1.6 SOLUTION ORAL
Qty: 2 | Refills: 0 | Status: ACTIVE | COMMUNITY
Start: 2023-12-06 | End: 1900-01-01

## 2023-12-08 ENCOUNTER — APPOINTMENT (OUTPATIENT)
Dept: ELECTROPHYSIOLOGY | Facility: CLINIC | Age: 68
End: 2023-12-08
Payer: COMMERCIAL

## 2023-12-08 ENCOUNTER — NON-APPOINTMENT (OUTPATIENT)
Age: 68
End: 2023-12-08

## 2023-12-09 PROCEDURE — 93298 REM INTERROG DEV EVAL SCRMS: CPT

## 2023-12-09 PROCEDURE — G2066: CPT

## 2023-12-21 ENCOUNTER — OUTPATIENT (OUTPATIENT)
Dept: OUTPATIENT SERVICES | Facility: HOSPITAL | Age: 68
LOS: 1 days | End: 2023-12-21
Payer: COMMERCIAL

## 2023-12-21 ENCOUNTER — APPOINTMENT (OUTPATIENT)
Dept: MRI IMAGING | Facility: CLINIC | Age: 68
End: 2023-12-21
Payer: COMMERCIAL

## 2023-12-21 DIAGNOSIS — K76.89 OTHER SPECIFIED DISEASES OF LIVER: ICD-10-CM

## 2023-12-21 DIAGNOSIS — Z00.8 ENCOUNTER FOR OTHER GENERAL EXAMINATION: ICD-10-CM

## 2023-12-21 DIAGNOSIS — R74.8 ABNORMAL LEVELS OF OTHER SERUM ENZYMES: ICD-10-CM

## 2023-12-21 PROCEDURE — 74183 MRI ABD W/O CNTR FLWD CNTR: CPT

## 2023-12-21 PROCEDURE — 74183 MRI ABD W/O CNTR FLWD CNTR: CPT | Mod: 26

## 2023-12-21 PROCEDURE — A9585: CPT

## 2023-12-22 ENCOUNTER — NON-APPOINTMENT (OUTPATIENT)
Age: 68
End: 2023-12-22

## 2023-12-29 ENCOUNTER — NON-APPOINTMENT (OUTPATIENT)
Age: 68
End: 2023-12-29

## 2023-12-29 ENCOUNTER — APPOINTMENT (OUTPATIENT)
Dept: ELECTROPHYSIOLOGY | Facility: CLINIC | Age: 68
End: 2023-12-29
Payer: COMMERCIAL

## 2023-12-29 VITALS
DIASTOLIC BLOOD PRESSURE: 60 MMHG | HEIGHT: 69 IN | WEIGHT: 195 LBS | SYSTOLIC BLOOD PRESSURE: 124 MMHG | BODY MASS INDEX: 28.88 KG/M2 | HEART RATE: 75 BPM | OXYGEN SATURATION: 97 %

## 2023-12-29 DIAGNOSIS — Z95.818 PRESENCE OF OTHER CARDIAC IMPLANTS AND GRAFTS: ICD-10-CM

## 2023-12-29 PROCEDURE — 93285 PRGRMG DEV EVAL SCRMS IP: CPT

## 2023-12-29 PROCEDURE — 99212 OFFICE O/P EST SF 10 MIN: CPT

## 2023-12-29 RX ORDER — OMEPRAZOLE 40 MG/1
40 CAPSULE, DELAYED RELEASE ORAL
Qty: 90 | Refills: 4 | Status: DISCONTINUED | COMMUNITY
Start: 2022-09-27 | End: 2023-12-29

## 2023-12-29 RX ORDER — SULFAMETHOXAZOLE AND TRIMETHOPRIM 800; 160 MG/1; MG/1
800-160 TABLET ORAL TWICE DAILY
Qty: 14 | Refills: 0 | Status: DISCONTINUED | COMMUNITY
Start: 2023-10-25 | End: 2023-12-29

## 2023-12-29 NOTE — ASSESSMENT
[FreeTextEntry1] : ILR reveals PAF 4.6% He is compliant with Xarelto and propafenone  PO BID. The longest episode was >24 hrs on 11/22/23.

## 2023-12-29 NOTE — DISCUSSION/SUMMARY
[Patient] : the patient [FreeTextEntry1] : Pt will be relocating to [place], [place] within the next 1-2 months. We will continue to follow him remotely until he establishes care with a new physician there. Continue current medications.

## 2023-12-29 NOTE — HISTORY OF PRESENT ILLNESS
[FreeTextEntry1] : 68 year-old male presents for ILR interrogation, the device was implanted for suspected AT Fib. He has a history of PAF prior ablation x2, TIA, HTN, mild LENNY not complaint with CPAP, status post ILR. Patient has been on Xarelto 20 mg daily, also takes ASA 81 mg daily. He has recurrent pAF and remains on propafenone 325mg bid. He has occasional blood in stool. He has occasional palpitations. He cut his alcohol intake. Denies cp, sob, syncope but complains of extreme fatigue, chadsvasc score 4 (age-1, htn, 1-, cva-2) Pt is having a colonoscopy next week in Jan 2024.  Cardiac work-up: TTE from 6/2023 shows normal LVEF 60% mild left atrial enlargement.

## 2024-01-02 ENCOUNTER — RESULT REVIEW (OUTPATIENT)
Age: 69
End: 2024-01-02

## 2024-01-02 ENCOUNTER — APPOINTMENT (OUTPATIENT)
Dept: GASTROENTEROLOGY | Facility: AMBULATORY MEDICAL SERVICES | Age: 69
End: 2024-01-02
Payer: COMMERCIAL

## 2024-01-02 PROCEDURE — 45380 COLONOSCOPY AND BIOPSY: CPT

## 2024-02-01 ENCOUNTER — APPOINTMENT (OUTPATIENT)
Dept: ELECTROPHYSIOLOGY | Facility: CLINIC | Age: 69
End: 2024-02-01
Payer: COMMERCIAL

## 2024-02-02 ENCOUNTER — NON-APPOINTMENT (OUTPATIENT)
Age: 69
End: 2024-02-02

## 2024-02-03 PROCEDURE — 93298 REM INTERROG DEV EVAL SCRMS: CPT

## 2024-02-05 ENCOUNTER — NON-APPOINTMENT (OUTPATIENT)
Age: 69
End: 2024-02-05

## 2024-02-15 ENCOUNTER — APPOINTMENT (OUTPATIENT)
Dept: ELECTROPHYSIOLOGY | Facility: CLINIC | Age: 69
End: 2024-02-15
Payer: COMMERCIAL

## 2024-02-15 VITALS
DIASTOLIC BLOOD PRESSURE: 78 MMHG | HEIGHT: 69 IN | BODY MASS INDEX: 28.88 KG/M2 | HEART RATE: 98 BPM | OXYGEN SATURATION: 95 % | WEIGHT: 195 LBS | SYSTOLIC BLOOD PRESSURE: 114 MMHG

## 2024-02-15 DIAGNOSIS — I48.0 PAROXYSMAL ATRIAL FIBRILLATION: ICD-10-CM

## 2024-02-15 PROCEDURE — 93285 PRGRMG DEV EVAL SCRMS IP: CPT

## 2024-02-15 PROCEDURE — 99211 OFF/OP EST MAY X REQ PHY/QHP: CPT

## 2024-02-15 RX ORDER — DOFETILIDE 0.25 MG/1
250 CAPSULE ORAL TWICE DAILY
Qty: 60 | Refills: 0 | Status: ACTIVE | COMMUNITY
Start: 2024-02-15 | End: 1900-01-01

## 2024-02-26 ENCOUNTER — TRANSCRIPTION ENCOUNTER (OUTPATIENT)
Age: 69
End: 2024-02-26

## 2024-02-26 ENCOUNTER — RX RENEWAL (OUTPATIENT)
Age: 69
End: 2024-02-26

## 2024-02-26 RX ORDER — AMLODIPINE BESYLATE 5 MG/1
5 TABLET ORAL
Qty: 90 | Refills: 3 | Status: ACTIVE | COMMUNITY
Start: 2021-11-26 | End: 1900-01-01

## 2024-02-27 ENCOUNTER — RX RENEWAL (OUTPATIENT)
Age: 69
End: 2024-02-27

## 2024-02-27 RX ORDER — RIVAROXABAN 20 MG/1
20 TABLET, FILM COATED ORAL
Qty: 90 | Refills: 0 | Status: ACTIVE | COMMUNITY
Start: 2022-01-18 | End: 1900-01-01

## 2024-03-12 ENCOUNTER — TRANSCRIPTION ENCOUNTER (OUTPATIENT)
Age: 69
End: 2024-03-12

## 2024-03-18 ENCOUNTER — APPOINTMENT (OUTPATIENT)
Dept: ELECTROPHYSIOLOGY | Facility: CLINIC | Age: 69
End: 2024-03-18

## 2024-03-21 NOTE — HISTORY OF PRESENT ILLNESS
[FreeTextEntry1] : 68 year-old male presents for ILR interrogation. He has a history of PAF prior ablation x2, TIA, HTN, mild LENNY not complaint with CPAP, status post ILR. Patient has been on Xarelto 20 mg daily, also takes ASA 81 mg daily. He has recurrent pAF, was on propafenone 325mg bid. At last visit, he reports increased fatigue and feeling tired, dose was reduced to  225mg BID.  He presents now for ILR check, with more frequent and prolonged episodes of afib, presently in afib with rates 100-110.   He is symptomatic primarily with feeling increasingly tired.   Cardiac work-up: TTE from 6/2023 shows normal LVEF 60% mild left atrial enlargement.

## 2024-03-21 NOTE — DISCUSSION/SUMMARY
[FreeTextEntry1] : 68 year-old male presents for ILR interrogation. He has a history of PAF prior ablation x2, TIA, HTN, mild LENNY not complaint with CPAP, status post ILR. Patient has been on Xarelto 20 mg daily, also takes ASA 81 mg daily. He has recurrent pAF, was on propafenone 325mg bid. At last visit, he reports increased fatigue and feeling tired, dose was reduced to  225mg BID.  He presents now for ILR check, with more frequent and prolonged episodes of afib, presently in afib with rates 100-110.   He is symptomatic primarily with feeling increasingly tired.   discussed that given break through arrhythmia, recommended changing ADD to tikosyn - will need to be hospitalized for initial drug loading  will need 4 day holiday from propaphenone   continue doac without interruption.  He also reports that will be relocating to [place], [place] within the next month

## 2024-03-25 ENCOUNTER — RX RENEWAL (OUTPATIENT)
Age: 69
End: 2024-03-25

## 2024-04-12 ENCOUNTER — APPOINTMENT (OUTPATIENT)
Dept: ELECTROPHYSIOLOGY | Facility: CLINIC | Age: 69
End: 2024-04-12

## 2024-05-20 ENCOUNTER — RX RENEWAL (OUTPATIENT)
Age: 69
End: 2024-05-20

## 2024-05-20 RX ORDER — FAMOTIDINE 20 MG/1
20 TABLET, FILM COATED ORAL TWICE DAILY
Qty: 180 | Refills: 0 | Status: ACTIVE | COMMUNITY
Start: 2023-12-06 | End: 1900-01-01

## 2024-08-23 ENCOUNTER — APPOINTMENT (OUTPATIENT)
Dept: ELECTROPHYSIOLOGY | Facility: CLINIC | Age: 69
End: 2024-08-23
